# Patient Record
Sex: FEMALE | Race: WHITE | Employment: OTHER | URBAN - METROPOLITAN AREA
[De-identification: names, ages, dates, MRNs, and addresses within clinical notes are randomized per-mention and may not be internally consistent; named-entity substitution may affect disease eponyms.]

---

## 2017-01-01 ENCOUNTER — ANESTHESIA (OUTPATIENT)
Dept: SURGERY | Age: 72
DRG: 853 | End: 2017-01-01
Payer: MEDICARE

## 2017-01-01 ENCOUNTER — HOSPITAL ENCOUNTER (OUTPATIENT)
Dept: SURGERY | Age: 72
Discharge: HOME | End: 2017-02-27

## 2017-01-01 ENCOUNTER — APPOINTMENT (OUTPATIENT)
Dept: GENERAL RADIOLOGY | Age: 72
DRG: 853 | End: 2017-01-01
Attending: EMERGENCY MEDICINE
Payer: MEDICARE

## 2017-01-01 ENCOUNTER — PATIENT OUTREACH (OUTPATIENT)
Dept: CASE MANAGEMENT | Age: 72
End: 2017-01-01

## 2017-01-01 ENCOUNTER — APPOINTMENT (OUTPATIENT)
Dept: GENERAL RADIOLOGY | Age: 72
DRG: 853 | End: 2017-01-01
Attending: ANESTHESIOLOGY
Payer: MEDICARE

## 2017-01-01 ENCOUNTER — HOSPITAL ENCOUNTER (INPATIENT)
Age: 72
LOS: 2 days | DRG: 853 | End: 2017-03-01
Attending: ORTHOPAEDIC SURGERY | Admitting: INTERNAL MEDICINE
Payer: MEDICARE

## 2017-01-01 ENCOUNTER — HOSPITAL ENCOUNTER (EMERGENCY)
Age: 72
Discharge: HOME | DRG: 853 | End: 2017-02-25
Attending: EMERGENCY MEDICINE
Payer: MEDICARE

## 2017-01-01 ENCOUNTER — HOSPITAL ENCOUNTER (OUTPATIENT)
Dept: LAB | Age: 72
Discharge: HOME | End: 2017-02-22
Payer: MEDICARE

## 2017-01-01 ENCOUNTER — HOSPITAL ENCOUNTER (INPATIENT)
Age: 72
LOS: 1 days | DRG: 853 | End: 2017-03-02
Attending: EMERGENCY MEDICINE | Admitting: INTERNAL MEDICINE
Payer: MEDICARE

## 2017-01-01 ENCOUNTER — ANESTHESIA EVENT (OUTPATIENT)
Dept: SURGERY | Age: 72
DRG: 853 | End: 2017-01-01
Payer: MEDICARE

## 2017-01-01 ENCOUNTER — APPOINTMENT (OUTPATIENT)
Dept: GENERAL RADIOLOGY | Age: 72
DRG: 853 | End: 2017-01-01
Attending: INTERNAL MEDICINE
Payer: MEDICARE

## 2017-01-01 ENCOUNTER — SURGERY (OUTPATIENT)
Age: 72
End: 2017-01-01

## 2017-01-01 VITALS
WEIGHT: 255.07 LBS | BODY MASS INDEX: 40.99 KG/M2 | HEART RATE: 92 BPM | HEIGHT: 66 IN | OXYGEN SATURATION: 95 % | TEMPERATURE: 97 F | DIASTOLIC BLOOD PRESSURE: 48 MMHG | RESPIRATION RATE: 26 BRPM | SYSTOLIC BLOOD PRESSURE: 108 MMHG

## 2017-01-01 VITALS
DIASTOLIC BLOOD PRESSURE: 52 MMHG | TEMPERATURE: 98.3 F | WEIGHT: 232 LBS | BODY MASS INDEX: 37.28 KG/M2 | SYSTOLIC BLOOD PRESSURE: 105 MMHG | HEART RATE: 100 BPM | OXYGEN SATURATION: 91 % | RESPIRATION RATE: 16 BRPM | HEIGHT: 66 IN

## 2017-01-01 VITALS — OXYGEN SATURATION: 49 % | SYSTOLIC BLOOD PRESSURE: 71 MMHG | TEMPERATURE: 103 F | DIASTOLIC BLOOD PRESSURE: 39 MMHG

## 2017-01-01 DIAGNOSIS — R65.21 SEPTIC SHOCK (HCC): ICD-10-CM

## 2017-01-01 DIAGNOSIS — A41.9 SEPTIC SHOCK (HCC): ICD-10-CM

## 2017-01-01 DIAGNOSIS — I46.9 CARDIAC ARREST (HCC): Primary | ICD-10-CM

## 2017-01-01 DIAGNOSIS — M25.561 PAIN AND SWELLING OF RIGHT KNEE: Primary | ICD-10-CM

## 2017-01-01 DIAGNOSIS — I46.9 CARDIOPULMONARY ARREST WITH SUCCESSFUL RESUSCITATION (HCC): ICD-10-CM

## 2017-01-01 DIAGNOSIS — N17.9 ACUTE RENAL FAILURE, UNSPECIFIED ACUTE RENAL FAILURE TYPE (HCC): ICD-10-CM

## 2017-01-01 DIAGNOSIS — M25.461 PAIN AND SWELLING OF RIGHT KNEE: Primary | ICD-10-CM

## 2017-01-01 DIAGNOSIS — Z99.2 ESRD (END STAGE RENAL DISEASE) ON DIALYSIS (HCC): ICD-10-CM

## 2017-01-01 DIAGNOSIS — N18.6 ESRD (END STAGE RENAL DISEASE) ON DIALYSIS (HCC): ICD-10-CM

## 2017-01-01 DIAGNOSIS — Z99.2 TYPE 2 DIABETES MELLITUS WITH CHRONIC KIDNEY DISEASE ON CHRONIC DIALYSIS, WITH LONG-TERM CURRENT USE OF INSULIN (HCC): ICD-10-CM

## 2017-01-01 DIAGNOSIS — N18.6 TYPE 2 DIABETES MELLITUS WITH CHRONIC KIDNEY DISEASE ON CHRONIC DIALYSIS, WITH LONG-TERM CURRENT USE OF INSULIN (HCC): ICD-10-CM

## 2017-01-01 DIAGNOSIS — Z66 DNR (DO NOT RESUSCITATE): ICD-10-CM

## 2017-01-01 DIAGNOSIS — E11.22 TYPE 2 DIABETES MELLITUS WITH CHRONIC KIDNEY DISEASE ON CHRONIC DIALYSIS, WITH LONG-TERM CURRENT USE OF INSULIN (HCC): ICD-10-CM

## 2017-01-01 DIAGNOSIS — Z79.4 TYPE 2 DIABETES MELLITUS WITH CHRONIC KIDNEY DISEASE ON CHRONIC DIALYSIS, WITH LONG-TERM CURRENT USE OF INSULIN (HCC): ICD-10-CM

## 2017-01-01 DIAGNOSIS — J96.01 ACUTE RESPIRATORY FAILURE WITH HYPOXIA (HCC): ICD-10-CM

## 2017-01-01 DIAGNOSIS — E87.20 LACTIC ACIDOSIS: ICD-10-CM

## 2017-01-01 DIAGNOSIS — N18.6 END STAGE RENAL DISEASE (HCC): ICD-10-CM

## 2017-01-01 DIAGNOSIS — G93.1 ANOXIC BRAIN INJURY (HCC): ICD-10-CM

## 2017-01-01 LAB
ALBUMIN SERPL BCP-MCNC: 2 G/DL (ref 3.2–4.6)
ALBUMIN SERPL BCP-MCNC: 2.1 G/DL (ref 3.2–4.6)
ALBUMIN SERPL BCP-MCNC: 2.7 G/DL (ref 3.2–4.6)
ALBUMIN/GLOB SERPL: 0.5 {RATIO} (ref 1.2–3.5)
ALBUMIN/GLOB SERPL: 0.5 {RATIO} (ref 1.2–3.5)
ALBUMIN/GLOB SERPL: 0.6 {RATIO} (ref 1.2–3.5)
ALP SERPL-CCNC: 112 U/L (ref 50–136)
ALP SERPL-CCNC: 132 U/L (ref 50–136)
ALP SERPL-CCNC: 134 U/L (ref 50–136)
ALT SERPL-CCNC: 101 U/L (ref 12–65)
ALT SERPL-CCNC: 146 U/L (ref 12–65)
ALT SERPL-CCNC: 251 U/L (ref 12–65)
ANION GAP BLD CALC-SCNC: 11 MMOL/L (ref 7–16)
ANION GAP BLD CALC-SCNC: 15 MMOL/L (ref 7–16)
ANION GAP BLD CALC-SCNC: 16 MMOL/L (ref 7–16)
ANION GAP BLD CALC-SCNC: 17 MMOL/L (ref 7–16)
ANION GAP BLD CALC-SCNC: 23 MMOL/L (ref 7–16)
ANION GAP BLD CALC-SCNC: 26 MMOL/L (ref 7–16)
APPEARANCE FLD: NORMAL
APTT PPP: 43.2 SEC (ref 25.3–32.9)
ARTERIAL PATENCY WRIST A: ABNORMAL
ARTERIAL PATENCY WRIST A: POSITIVE
AST SERPL W P-5'-P-CCNC: 23 U/L (ref 15–37)
AST SERPL W P-5'-P-CCNC: 258 U/L (ref 15–37)
AST SERPL W P-5'-P-CCNC: 923 U/L (ref 15–37)
ATRIAL RATE: 117 BPM
ATRIAL RATE: 232 BPM
ATRIAL RATE: 84 BPM
BACTERIA SPEC CULT: ABNORMAL
BACTERIA SPEC CULT: ABNORMAL
BACTERIA SPEC CULT: NORMAL
BASE DEFICIT BLDA-SCNC: 8 MMOL/L (ref 0–2)
BASE DEFICIT BLDA-SCNC: 8.9 MMOL/L (ref 0–2)
BASE DEFICIT BLDA-SCNC: 9.7 MMOL/L (ref 0–2)
BASE EXCESS BLDA CALC-SCNC: 0.1 MMOL/L (ref 0–3)
BASE EXCESS BLDA CALC-SCNC: 0.6 MMOL/L (ref 0–3)
BASOPHILS # BLD AUTO: 0 K/UL (ref 0–0.2)
BASOPHILS # BLD: 0 % (ref 0–2)
BDY SITE: ABNORMAL
BILIRUB SERPL-MCNC: 0.5 MG/DL (ref 0.2–1.1)
BILIRUB SERPL-MCNC: 0.9 MG/DL (ref 0.2–1.1)
BILIRUB SERPL-MCNC: 1 MG/DL (ref 0.2–1.1)
BUN SERPL-MCNC: 27 MG/DL (ref 8–23)
BUN SERPL-MCNC: 45 MG/DL (ref 8–23)
BUN SERPL-MCNC: 47 MG/DL (ref 8–23)
BUN SERPL-MCNC: 52 MG/DL (ref 8–23)
BUN SERPL-MCNC: 56 MG/DL (ref 8–23)
BUN SERPL-MCNC: 67 MG/DL (ref 8–23)
CALCIUM SERPL-MCNC: 10.4 MG/DL (ref 8.3–10.4)
CALCIUM SERPL-MCNC: 8.7 MG/DL (ref 8.3–10.4)
CALCIUM SERPL-MCNC: 8.8 MG/DL (ref 8.3–10.4)
CALCIUM SERPL-MCNC: 8.9 MG/DL (ref 8.3–10.4)
CALCIUM SERPL-MCNC: 9.3 MG/DL (ref 8.3–10.4)
CALCIUM SERPL-MCNC: 9.4 MG/DL (ref 8.3–10.4)
CALCULATED P AXIS, ECG09: 63 DEGREES
CALCULATED P AXIS, ECG09: 67 DEGREES
CALCULATED P AXIS, ECG09: NORMAL DEGREES
CALCULATED R AXIS, ECG10: -116 DEGREES
CALCULATED R AXIS, ECG10: -138 DEGREES
CALCULATED R AXIS, ECG10: 56 DEGREES
CALCULATED T AXIS, ECG11: -161 DEGREES
CALCULATED T AXIS, ECG11: -62 DEGREES
CALCULATED T AXIS, ECG11: 85 DEGREES
CHLORIDE SERPL-SCNC: 100 MMOL/L (ref 98–107)
CHLORIDE SERPL-SCNC: 94 MMOL/L (ref 98–107)
CHLORIDE SERPL-SCNC: 95 MMOL/L (ref 98–107)
CHLORIDE SERPL-SCNC: 95 MMOL/L (ref 98–107)
CHLORIDE SERPL-SCNC: 99 MMOL/L (ref 98–107)
CHLORIDE SERPL-SCNC: 99 MMOL/L (ref 98–107)
CO2 SERPL-SCNC: 18 MMOL/L (ref 21–32)
CO2 SERPL-SCNC: 19 MMOL/L (ref 21–32)
CO2 SERPL-SCNC: 23 MMOL/L (ref 21–32)
CO2 SERPL-SCNC: 25 MMOL/L (ref 21–32)
CO2 SERPL-SCNC: 27 MMOL/L (ref 21–32)
CO2 SERPL-SCNC: 29 MMOL/L (ref 21–32)
COHGB MFR BLD: 0 % (ref 0.5–1.5)
COHGB MFR BLD: 0.3 % (ref 0.5–1.5)
COHGB MFR BLD: 0.9 % (ref 0.5–1.5)
COLOR FLD: NORMAL
CREAT SERPL-MCNC: 5.08 MG/DL (ref 0.6–1)
CREAT SERPL-MCNC: 7.35 MG/DL (ref 0.6–1)
CREAT SERPL-MCNC: 7.74 MG/DL (ref 0.6–1)
CREAT SERPL-MCNC: 8.67 MG/DL (ref 0.6–1)
CREAT SERPL-MCNC: 8.95 MG/DL (ref 0.6–1)
CREAT SERPL-MCNC: 9.25 MG/DL (ref 0.6–1)
CRP SERPL HS-MCNC: >190 MG/L
CRP SERPL-MCNC: 19 MG/DL (ref 0–0.9)
DIAGNOSIS, 93000: NORMAL
DIASTOLIC BP, ECG02: NORMAL MMHG
DIFFERENTIAL METHOD BLD: ABNORMAL
DO-HGB BLD-MCNC: 1 % (ref 0–5)
DO-HGB BLD-MCNC: 1 % (ref 0–5)
DO-HGB BLD-MCNC: 2 % (ref 0–5)
EOSINOPHIL # BLD: 0 K/UL (ref 0–0.8)
EOSINOPHIL # BLD: 0.1 K/UL (ref 0–0.8)
EOSINOPHIL # BLD: 0.1 K/UL (ref 0–0.8)
EOSINOPHIL NFR BLD: 0 % (ref 0.5–7.8)
EOSINOPHIL NFR BLD: 1 % (ref 0.5–7.8)
EOSINOPHIL NFR BLD: 1 % (ref 0.5–7.8)
ERYTHROCYTE [DISTWIDTH] IN BLOOD BY AUTOMATED COUNT: 14.2 % (ref 11.9–14.6)
ERYTHROCYTE [DISTWIDTH] IN BLOOD BY AUTOMATED COUNT: 14.3 % (ref 11.9–14.6)
ERYTHROCYTE [DISTWIDTH] IN BLOOD BY AUTOMATED COUNT: 14.5 % (ref 11.9–14.6)
ERYTHROCYTE [DISTWIDTH] IN BLOOD BY AUTOMATED COUNT: 14.6 % (ref 11.9–14.6)
ERYTHROCYTE [DISTWIDTH] IN BLOOD BY AUTOMATED COUNT: 14.9 % (ref 11.9–14.6)
ERYTHROCYTE [SEDIMENTATION RATE] IN BLOOD: 105 MM/HR (ref 0–30)
ERYTHROCYTE [SEDIMENTATION RATE] IN BLOOD: 109 MM/HR (ref 0–30)
FIO2 ON VENT: 100 %
FIO2 ON VENT: 100 %
FIO2 ON VENT: 40 %
FIO2 ON VENT: 50 %
GAS FLOW.O2 O2 DELIVERY SYS: 4 L/MIN
GLOBULIN SER CALC-MCNC: 4.2 G/DL (ref 2.3–3.5)
GLOBULIN SER CALC-MCNC: 4.3 G/DL (ref 2.3–3.5)
GLOBULIN SER CALC-MCNC: 4.4 G/DL (ref 2.3–3.5)
GLUCOSE BLD STRIP.AUTO-MCNC: 121 MG/DL (ref 65–100)
GLUCOSE BLD STRIP.AUTO-MCNC: 132 MG/DL (ref 65–100)
GLUCOSE BLD STRIP.AUTO-MCNC: 133 MG/DL (ref 65–100)
GLUCOSE BLD STRIP.AUTO-MCNC: 166 MG/DL (ref 65–100)
GLUCOSE BLD STRIP.AUTO-MCNC: 195 MG/DL (ref 65–100)
GLUCOSE BLD STRIP.AUTO-MCNC: 256 MG/DL (ref 65–100)
GLUCOSE BLD STRIP.AUTO-MCNC: 286 MG/DL (ref 65–100)
GLUCOSE SERPL-MCNC: 100 MG/DL (ref 65–100)
GLUCOSE SERPL-MCNC: 111 MG/DL (ref 65–100)
GLUCOSE SERPL-MCNC: 120 MG/DL (ref 65–100)
GLUCOSE SERPL-MCNC: 294 MG/DL (ref 65–100)
GLUCOSE SERPL-MCNC: 36 MG/DL (ref 65–100)
GLUCOSE SERPL-MCNC: 58 MG/DL (ref 65–100)
GRAM STN SPEC: ABNORMAL
GRAM STN SPEC: ABNORMAL
HCO3 BLDA-SCNC: 16 MMOL/L (ref 22–26)
HCO3 BLDA-SCNC: 16 MMOL/L (ref 22–26)
HCO3 BLDA-SCNC: 19 MMOL/L (ref 22–26)
HCO3 BLDA-SCNC: 22 MMOL/L (ref 22–26)
HCO3 BLDA-SCNC: 25 MMOL/L (ref 22–26)
HCT VFR BLD AUTO: 28.7 % (ref 35.8–46.3)
HCT VFR BLD AUTO: 29.3 % (ref 35.8–46.3)
HCT VFR BLD AUTO: 29.7 % (ref 35.8–46.3)
HCT VFR BLD AUTO: 30.1 % (ref 35.8–46.3)
HCT VFR BLD AUTO: 31.2 % (ref 35.8–46.3)
HGB BLD-MCNC: 8.9 G/DL (ref 11.7–15.4)
HGB BLD-MCNC: 9.2 G/DL (ref 11.7–15.4)
HGB BLD-MCNC: 9.2 G/DL (ref 11.7–15.4)
HGB BLD-MCNC: 9.4 G/DL (ref 11.7–15.4)
HGB BLD-MCNC: 9.5 G/DL (ref 11.7–15.4)
HGB BLD-MCNC: 9.6 G/DL (ref 11.7–15.4)
HGB BLD-MCNC: 9.6 G/DL (ref 11.7–15.4)
HGB BLDMV-MCNC: 10.5 GM/DL (ref 11.7–15)
HGB BLDMV-MCNC: 11.7 GM/DL (ref 11.7–15)
HGB BLDMV-MCNC: 7.2 GM/DL (ref 11.7–15)
IMM GRANULOCYTES # BLD: 0 K/UL (ref 0–0.5)
IMM GRANULOCYTES # BLD: 0 K/UL (ref 0–0.5)
IMM GRANULOCYTES # BLD: 0.1 K/UL (ref 0–0.5)
IMM GRANULOCYTES NFR BLD AUTO: 0.2 % (ref 0–5)
IMM GRANULOCYTES NFR BLD AUTO: 0.3 % (ref 0–5)
IMM GRANULOCYTES NFR BLD AUTO: 0.5 % (ref 0–5)
INR PPP: 1.2 (ref 0.9–1.2)
INR PPP: 1.8 (ref 0.9–1.2)
LACTATE BLD-SCNC: 1.3 MMOL/L (ref 0.5–1.9)
LACTATE SERPL-SCNC: 2.7 MMOL/L (ref 0.4–2)
LACTATE SERPL-SCNC: 3.7 MMOL/L (ref 0.4–2)
LACTATE SERPL-SCNC: 6.3 MMOL/L (ref 0.4–2)
LACTATE SERPL-SCNC: 8.7 MMOL/L (ref 0.4–2)
LYMPHOCYTES # BLD AUTO: 11 %
LYMPHOCYTES # BLD AUTO: 11 % (ref 13–44)
LYMPHOCYTES # BLD AUTO: 13 % (ref 13–44)
LYMPHOCYTES # BLD AUTO: 16 % (ref 13–44)
LYMPHOCYTES # BLD: 0.6 K/UL (ref 0.5–4.6)
LYMPHOCYTES # BLD: 1.2 K/UL (ref 0.5–4.6)
LYMPHOCYTES # BLD: 1.5 K/UL (ref 0.5–4.6)
LYMPHOCYTES # BLD: 1.5 K/UL (ref 0.5–4.6)
LYMPHOCYTES # BLD: 1.7 K/UL (ref 0.5–4.6)
LYMPHOCYTES NFR BLD MANUAL: 9 % (ref 16–44)
LYMPHOCYTES NFR FLD: 6 %
MAGNESIUM SERPL-MCNC: 1.6 MG/DL (ref 1.8–2.4)
MCH RBC QN AUTO: 32.6 PG (ref 26.1–32.9)
MCH RBC QN AUTO: 32.6 PG (ref 26.1–32.9)
MCH RBC QN AUTO: 32.7 PG (ref 26.1–32.9)
MCH RBC QN AUTO: 33.1 PG (ref 26.1–32.9)
MCH RBC QN AUTO: 33.3 PG (ref 26.1–32.9)
MCHC RBC AUTO-ENTMCNC: 30.8 G/DL (ref 31.4–35)
MCHC RBC AUTO-ENTMCNC: 31 G/DL (ref 31.4–35)
MCHC RBC AUTO-ENTMCNC: 31.9 G/DL (ref 31.4–35)
MCHC RBC AUTO-ENTMCNC: 32 G/DL (ref 31.4–35)
MCHC RBC AUTO-ENTMCNC: 32.1 G/DL (ref 31.4–35)
MCV RBC AUTO: 101.7 FL (ref 79.6–97.8)
MCV RBC AUTO: 102.1 FL (ref 79.6–97.8)
MCV RBC AUTO: 104.5 FL (ref 79.6–97.8)
MCV RBC AUTO: 105.5 FL (ref 79.6–97.8)
MCV RBC AUTO: 107.6 FL (ref 79.6–97.8)
METAMYELOCYTES NFR BLD MANUAL: 1 %
METHGB MFR BLD: 0.2 % (ref 0–1.5)
METHGB MFR BLD: 0.3 % (ref 0–1.5)
METHGB MFR BLD: 0.3 % (ref 0–1.5)
MONOCYTES # BLD: 0.1 K/UL (ref 0.1–1.3)
MONOCYTES # BLD: 0.2 K/UL (ref 0.1–1.3)
MONOCYTES # BLD: 0.6 K/UL (ref 0.1–1.3)
MONOCYTES # BLD: 0.6 K/UL (ref 0.1–1.3)
MONOCYTES # BLD: 1.1 K/UL (ref 0.1–1.3)
MONOCYTES NFR BLD AUTO: 1 %
MONOCYTES NFR BLD AUTO: 10 % (ref 4–12)
MONOCYTES NFR BLD AUTO: 8 % (ref 4–12)
MONOCYTES NFR BLD AUTO: 9 % (ref 4–12)
MONOCYTES NFR BLD MANUAL: 1 % (ref 3–9)
MONOS+MACROS NFR FLD: 1 %
NEUTS BAND NFR BLD MANUAL: 1 % (ref 0–10)
NEUTS SEG # BLD: 10.2 K/UL (ref 1.7–8.2)
NEUTS SEG # BLD: 12.2 K/UL (ref 1.7–8.2)
NEUTS SEG # BLD: 15.2 K/UL (ref 1.7–8.2)
NEUTS SEG # BLD: 4.3 K/UL (ref 1.7–8.2)
NEUTS SEG # BLD: 5.7 K/UL (ref 1.7–8.2)
NEUTS SEG NFR BLD AUTO: 75 % (ref 43–78)
NEUTS SEG NFR BLD AUTO: 78 % (ref 43–78)
NEUTS SEG NFR BLD AUTO: 78 % (ref 43–78)
NEUTS SEG NFR BLD AUTO: 86 %
NEUTS SEG NFR BLD MANUAL: 90 % (ref 47–75)
NEUTS SEG NFR FLD: 93 %
NRBC BLD-RTO: 1 PER 100 WBC
NUC CELL # FLD: NORMAL /CU MM
OXYHGB MFR BLDA: 96.6 % (ref 94–97)
OXYHGB MFR BLDA: 98.1 % (ref 94–97)
OXYHGB MFR BLDA: 98.6 % (ref 94–97)
P-R INTERVAL, ECG05: 183 MS
P-R INTERVAL, ECG05: NORMAL MS
P-R INTERVAL, ECG05: NORMAL MS
PCO2 BLDA: 24 MMHG (ref 35–45)
PCO2 BLDA: 34 MMHG (ref 35–45)
PCO2 BLDA: 37 MMHG (ref 35–45)
PCO2 BLDA: 42 MMHG (ref 35–45)
PCO2 BLDA: 45 MMHG (ref 35–45)
PEEP RESPIRATORY: 8 CM[H2O]
PH BLDA: 7.24 [PH] (ref 7.35–7.45)
PH BLDA: 7.27 [PH] (ref 7.35–7.45)
PH BLDA: 7.3 [PH] (ref 7.35–7.45)
PH BLDA: 7.39 [PH] (ref 7.35–7.45)
PH BLDA: 7.57 [PH] (ref 7.35–7.45)
PHOSPHATE SERPL-MCNC: 3.9 MG/DL (ref 2.3–3.7)
PLATELET # BLD AUTO: 128 K/UL (ref 150–450)
PLATELET # BLD AUTO: 129 K/UL (ref 150–450)
PLATELET # BLD AUTO: 131 K/UL (ref 150–450)
PLATELET # BLD AUTO: 132 K/UL (ref 150–450)
PLATELET # BLD AUTO: 146 K/UL (ref 150–450)
PLATELET COMMENTS,PCOM: ABNORMAL
PLATELET COMMENTS,PCOM: ADEQUATE
PMV BLD AUTO: 11.6 FL (ref 10.8–14.1)
PMV BLD AUTO: 12 FL (ref 10.8–14.1)
PMV BLD AUTO: 12.2 FL (ref 10.8–14.1)
PMV BLD AUTO: 12.5 FL (ref 10.8–14.1)
PMV BLD AUTO: 12.6 FL (ref 10.8–14.1)
PO2 BLDA: 139 MMHG (ref 75–100)
PO2 BLDA: 253 MMHG (ref 75–100)
PO2 BLDA: 343 MMHG (ref 75–100)
PO2 BLDA: 68 MMHG (ref 75–100)
PO2 BLDA: 76 MMHG (ref 75–100)
POTASSIUM SERPL-SCNC: 3.4 MMOL/L (ref 3.5–5.1)
POTASSIUM SERPL-SCNC: 4.1 MMOL/L (ref 3.5–5.1)
POTASSIUM SERPL-SCNC: 4.6 MMOL/L (ref 3.5–5.1)
POTASSIUM SERPL-SCNC: 5 MMOL/L (ref 3.5–5.1)
POTASSIUM SERPL-SCNC: 5.6 MMOL/L (ref 3.5–5.1)
POTASSIUM SERPL-SCNC: 5.8 MMOL/L (ref 3.5–5.1)
PROCALCITONIN SERPL-MCNC: 6.3 NG/ML
PROCALCITONIN SERPL-MCNC: 8.3 NG/ML
PROT SERPL-MCNC: 6.2 G/DL (ref 6.3–8.2)
PROT SERPL-MCNC: 6.4 G/DL (ref 6.3–8.2)
PROT SERPL-MCNC: 7.1 G/DL (ref 6.3–8.2)
PROTHROMBIN TIME: 12.5 SEC (ref 9.6–12)
PROTHROMBIN TIME: 18.5 SEC (ref 9.6–12)
Q-T INTERVAL, ECG07: 370 MS
Q-T INTERVAL, ECG07: 408 MS
Q-T INTERVAL, ECG07: 474 MS
QRS DURATION, ECG06: 112 MS
QRS DURATION, ECG06: 114 MS
QRS DURATION, ECG06: 125 MS
QTC CALCULATION (BEZET), ECG08: 482 MS
QTC CALCULATION (BEZET), ECG08: 514 MS
QTC CALCULATION (BEZET), ECG08: 678 MS
RBC # BLD AUTO: 2.72 M/UL (ref 4.05–5.25)
RBC # BLD AUTO: 2.88 M/UL (ref 4.05–5.25)
RBC # BLD AUTO: 2.88 M/UL (ref 4.05–5.25)
RBC # BLD AUTO: 2.9 M/UL (ref 4.05–5.25)
RBC # BLD AUTO: 2.91 M/UL (ref 4.05–5.25)
RBC # FLD: NORMAL /CU MM
RBC MORPH BLD: ABNORMAL
RESP RATE: 15
SAO2 % BLD: 98 % (ref 92–98.5)
SAO2 % BLD: 99 % (ref 92–98.5)
SAO2 % BLD: 99 % (ref 92–98.5)
SERVICE CMNT-IMP: ABNORMAL
SERVICE CMNT-IMP: NORMAL
SODIUM SERPL-SCNC: 135 MMOL/L (ref 136–145)
SODIUM SERPL-SCNC: 135 MMOL/L (ref 136–145)
SODIUM SERPL-SCNC: 138 MMOL/L (ref 136–145)
SODIUM SERPL-SCNC: 138 MMOL/L (ref 136–145)
SODIUM SERPL-SCNC: 140 MMOL/L (ref 136–145)
SODIUM SERPL-SCNC: 145 MMOL/L (ref 136–145)
SPECIMEN SOURCE FLD: NORMAL
SYSTOLIC BP, ECG01: NORMAL MMHG
TROPONIN I SERPL-MCNC: 0.06 NG/ML (ref 0.02–0.05)
TROPONIN I SERPL-MCNC: 0.09 NG/ML (ref 0.02–0.05)
TROPONIN I SERPL-MCNC: 0.1 NG/ML (ref 0.02–0.05)
TROPONIN I SERPL-MCNC: 0.62 NG/ML (ref 0.02–0.05)
VANCOMYCIN SERPL-MCNC: 25.1 UG/ML
VENTILATION MODE VENT: ABNORMAL
VENTRICULAR RATE, ECG03: 116 BPM
VENTRICULAR RATE, ECG03: 123 BPM
VENTRICULAR RATE, ECG03: 84 BPM
VT SETTING VENT: 600 ML
VT SETTING VENT: 600 ML
WBC # BLD AUTO: 13.1 K/UL (ref 4.3–11.1)
WBC # BLD AUTO: 13.8 K/UL (ref 4.3–11.1)
WBC # BLD AUTO: 16.9 K/UL (ref 4.3–11.1)
WBC # BLD AUTO: 5.5 K/UL (ref 4.3–11.1)
WBC # BLD AUTO: 7.6 K/UL (ref 4.3–11.1)
WBC MORPH BLD: ABNORMAL

## 2017-01-01 PROCEDURE — 85025 COMPLETE CBC W/AUTO DIFF WBC: CPT | Performed by: INTERNAL MEDICINE

## 2017-01-01 PROCEDURE — 74011636637 HC RX REV CODE- 636/637: Performed by: INTERNAL MEDICINE

## 2017-01-01 PROCEDURE — 87077 CULTURE AEROBIC IDENTIFY: CPT | Performed by: ORTHOPAEDIC SURGERY

## 2017-01-01 PROCEDURE — 5A12012 PERFORMANCE OF CARDIAC OUTPUT, SINGLE, MANUAL: ICD-10-PCS | Performed by: ANESTHESIOLOGY

## 2017-01-01 PROCEDURE — 0SRC0J9 REPLACEMENT OF RIGHT KNEE JOINT WITH SYNTHETIC SUBSTITUTE, CEMENTED, OPEN APPROACH: ICD-10-PCS | Performed by: ORTHOPAEDIC SURGERY

## 2017-01-01 PROCEDURE — 80053 COMPREHEN METABOLIC PANEL: CPT | Performed by: INTERNAL MEDICINE

## 2017-01-01 PROCEDURE — 89050 BODY FLUID CELL COUNT: CPT | Performed by: ORTHOPAEDIC SURGERY

## 2017-01-01 PROCEDURE — 83735 ASSAY OF MAGNESIUM: CPT | Performed by: INTERNAL MEDICINE

## 2017-01-01 PROCEDURE — 5A1935Z RESPIRATORY VENTILATION, LESS THAN 24 CONSECUTIVE HOURS: ICD-10-PCS | Performed by: INTERNAL MEDICINE

## 2017-01-01 PROCEDURE — 96375 TX/PRO/DX INJ NEW DRUG ADDON: CPT | Performed by: EMERGENCY MEDICINE

## 2017-01-01 PROCEDURE — 87075 CULTR BACTERIA EXCEPT BLOOD: CPT | Performed by: ORTHOPAEDIC SURGERY

## 2017-01-01 PROCEDURE — 99223 1ST HOSP IP/OBS HIGH 75: CPT | Performed by: INTERNAL MEDICINE

## 2017-01-01 PROCEDURE — 74011250636 HC RX REV CODE- 250/636: Performed by: INTERNAL MEDICINE

## 2017-01-01 PROCEDURE — 74011250637 HC RX REV CODE- 250/637: Performed by: INTERNAL MEDICINE

## 2017-01-01 PROCEDURE — 82962 GLUCOSE BLOOD TEST: CPT

## 2017-01-01 PROCEDURE — 99285 EMERGENCY DEPT VISIT HI MDM: CPT | Performed by: EMERGENCY MEDICINE

## 2017-01-01 PROCEDURE — 0BH17EZ INSERTION OF ENDOTRACHEAL AIRWAY INTO TRACHEA, VIA NATURAL OR ARTIFICIAL OPENING: ICD-10-PCS | Performed by: ANESTHESIOLOGY

## 2017-01-01 PROCEDURE — 74011000250 HC RX REV CODE- 250: Performed by: EMERGENCY MEDICINE

## 2017-01-01 PROCEDURE — 0SPC0JZ REMOVAL OF SYNTHETIC SUBSTITUTE FROM RIGHT KNEE JOINT, OPEN APPROACH: ICD-10-PCS | Performed by: ORTHOPAEDIC SURGERY

## 2017-01-01 PROCEDURE — 96374 THER/PROPH/DIAG INJ IV PUSH: CPT | Performed by: EMERGENCY MEDICINE

## 2017-01-01 PROCEDURE — 36600 WITHDRAWAL OF ARTERIAL BLOOD: CPT

## 2017-01-01 PROCEDURE — 74011000250 HC RX REV CODE- 250: Performed by: INTERNAL MEDICINE

## 2017-01-01 PROCEDURE — 5A12012 PERFORMANCE OF CARDIAC OUTPUT, SINGLE, MANUAL: ICD-10-PCS | Performed by: EMERGENCY MEDICINE

## 2017-01-01 PROCEDURE — 74011250636 HC RX REV CODE- 250/636: Performed by: EMERGENCY MEDICINE

## 2017-01-01 PROCEDURE — 83605 ASSAY OF LACTIC ACID: CPT | Performed by: INTERNAL MEDICINE

## 2017-01-01 PROCEDURE — 92950 HEART/LUNG RESUSCITATION CPR: CPT | Performed by: EMERGENCY MEDICINE

## 2017-01-01 PROCEDURE — 76450000000

## 2017-01-01 PROCEDURE — 82803 BLOOD GASES ANY COMBINATION: CPT

## 2017-01-01 PROCEDURE — 96376 TX/PRO/DX INJ SAME DRUG ADON: CPT | Performed by: EMERGENCY MEDICINE

## 2017-01-01 PROCEDURE — 87205 SMEAR GRAM STAIN: CPT | Performed by: ORTHOPAEDIC SURGERY

## 2017-01-01 PROCEDURE — 84100 ASSAY OF PHOSPHORUS: CPT | Performed by: INTERNAL MEDICINE

## 2017-01-01 PROCEDURE — 80048 BASIC METABOLIC PNL TOTAL CA: CPT | Performed by: EMERGENCY MEDICINE

## 2017-01-01 PROCEDURE — 74011000258 HC RX REV CODE- 258: Performed by: INTERNAL MEDICINE

## 2017-01-01 PROCEDURE — C9113 INJ PANTOPRAZOLE SODIUM, VIA: HCPCS | Performed by: INTERNAL MEDICINE

## 2017-01-01 PROCEDURE — 93005 ELECTROCARDIOGRAM TRACING: CPT | Performed by: EMERGENCY MEDICINE

## 2017-01-01 PROCEDURE — 65610000006 HC RM INTENSIVE CARE

## 2017-01-01 PROCEDURE — 87186 SC STD MICRODIL/AGAR DIL: CPT | Performed by: ORTHOPAEDIC SURGERY

## 2017-01-01 DEVICE — CEMENT BNE GENTAMC HV R+G 40GM -- PALACOS R+G 5036964: Type: IMPLANTABLE DEVICE | Site: KNEE | Status: FUNCTIONAL

## 2017-01-01 DEVICE — STIMULAN® RAPID CURE PROVIDED STERILE FOR SINGLE PATIENT USE. STIMULAN® RAPID CURE CONTAINS CALCIUM SULFATE POWDER AND MIXING SOLUTION IN PRE-MEASURED QUANTITIES SO THAT WHEN MIXED TOGETHER IN A STERILE MIXING BOWL, THE RESULTANT PASTE IS TO BE DIGITALLY PACKED INTO OPEN BONE VOID/GAP TO SET INSITU OR PLACED INTO THE MOULD PROVIDED, THE MIXTURE SETS TO FORM BEADS. THE BIODEGRADABLE, RADIOPAQUE BEADS ARE RESORBED IN APPROXIMATELY 30 – 60 DAYS WHEN USED IN ACCORDANCE WITH THE DEVICE LABELLING. STIMULAN® RAPID CURE IS MANUFACTURED FROM SYNTHETIC IMPLANT GRADE CALCIUM SULFATE DIHYDRATE(CASO4.2H2O) THAT RESORBS AND IS REPLACED WITH BONE DURING THE HEALING PROCESS. ALSO, AS THE BONE VOID FILLER BEADS ARE BIODEGRADABLE AND BIOCOMPATIBLE, THEY MAY BE USED AT AN INFECTED SITE.
Type: IMPLANTABLE DEVICE | Site: KNEE | Status: FUNCTIONAL
Brand: STIMULAN® RAPID CURE

## 2017-01-01 DEVICE — COMPNT FEM POST STBL 6 R --: Type: IMPLANTABLE DEVICE | Site: KNEE | Status: FUNCTIONAL

## 2017-01-01 RX ORDER — DIPHENHYDRAMINE HCL 25 MG
25 CAPSULE ORAL
Status: DISCONTINUED | OUTPATIENT
Start: 2017-01-01 | End: 2017-01-01 | Stop reason: HOSPADM

## 2017-01-01 RX ORDER — INSULIN GLARGINE 100 [IU]/ML
70 INJECTION, SOLUTION SUBCUTANEOUS
Status: DISCONTINUED | OUTPATIENT
Start: 2017-01-01 | End: 2017-01-01

## 2017-01-01 RX ORDER — INSULIN LISPRO 100 [IU]/ML
INJECTION, SOLUTION INTRAVENOUS; SUBCUTANEOUS
Status: CANCELLED | OUTPATIENT
Start: 2017-01-01

## 2017-01-01 RX ORDER — NEOMYCIN AND POLYMYXIN B SULFATES 40; 200000 MG/ML; [USP'U]/ML
SOLUTION IRRIGATION AS NEEDED
Status: DISCONTINUED | OUTPATIENT
Start: 2017-01-01 | End: 2017-01-01 | Stop reason: HOSPADM

## 2017-01-01 RX ORDER — PANTOPRAZOLE SODIUM 40 MG/1
40 TABLET, DELAYED RELEASE ORAL
Status: CANCELLED | OUTPATIENT
Start: 2017-01-01

## 2017-01-01 RX ORDER — SODIUM CHLORIDE 0.9 % (FLUSH) 0.9 %
5-10 SYRINGE (ML) INJECTION AS NEEDED
Status: DISCONTINUED | OUTPATIENT
Start: 2017-01-01 | End: 2017-01-01 | Stop reason: HOSPADM

## 2017-01-01 RX ORDER — NALOXONE HYDROCHLORIDE 0.4 MG/ML
.2-.4 INJECTION, SOLUTION INTRAMUSCULAR; INTRAVENOUS; SUBCUTANEOUS
Status: CANCELLED | OUTPATIENT
Start: 2017-01-01

## 2017-01-01 RX ORDER — ROPIVACAINE HYDROCHLORIDE 2 MG/ML
INJECTION, SOLUTION EPIDURAL; INFILTRATION; PERINEURAL AS NEEDED
Status: DISCONTINUED | OUTPATIENT
Start: 2017-01-01 | End: 2017-01-01 | Stop reason: HOSPADM

## 2017-01-01 RX ORDER — INSULIN GLARGINE 100 [IU]/ML
35 INJECTION, SOLUTION SUBCUTANEOUS
Status: CANCELLED | OUTPATIENT
Start: 2017-01-01

## 2017-01-01 RX ORDER — ROPINIROLE 0.25 MG/1
0.5 TABLET, FILM COATED ORAL
Status: CANCELLED | OUTPATIENT
Start: 2017-01-01

## 2017-01-01 RX ORDER — LORAZEPAM 0.5 MG/1
0.5 TABLET ORAL
Status: CANCELLED | OUTPATIENT
Start: 2017-01-01

## 2017-01-01 RX ORDER — SODIUM CHLORIDE 0.9 % (FLUSH) 0.9 %
5-10 SYRINGE (ML) INJECTION EVERY 8 HOURS
Status: DISCONTINUED | OUTPATIENT
Start: 2017-01-01 | End: 2017-01-01 | Stop reason: HOSPADM

## 2017-01-01 RX ORDER — ROPINIROLE 1 MG/1
0.5 TABLET, FILM COATED ORAL
Status: DISCONTINUED | OUTPATIENT
Start: 2017-01-01 | End: 2017-01-01 | Stop reason: HOSPADM

## 2017-01-01 RX ORDER — SODIUM CHLORIDE, SODIUM LACTATE, POTASSIUM CHLORIDE, CALCIUM CHLORIDE 600; 310; 30; 20 MG/100ML; MG/100ML; MG/100ML; MG/100ML
75 INJECTION, SOLUTION INTRAVENOUS CONTINUOUS
Status: DISCONTINUED | OUTPATIENT
Start: 2017-01-01 | End: 2017-01-01 | Stop reason: HOSPADM

## 2017-01-01 RX ORDER — GLYCOPYRROLATE 0.2 MG/ML
INJECTION INTRAMUSCULAR; INTRAVENOUS AS NEEDED
Status: DISCONTINUED | OUTPATIENT
Start: 2017-01-01 | End: 2017-01-01 | Stop reason: HOSPADM

## 2017-01-01 RX ORDER — SODIUM CHLORIDE 0.9 % (FLUSH) 0.9 %
5-10 SYRINGE (ML) INJECTION EVERY 8 HOURS
Status: DISCONTINUED | OUTPATIENT
Start: 2017-01-01 | End: 2017-01-01

## 2017-01-01 RX ORDER — DILTIAZEM HYDROCHLORIDE 180 MG/1
180 CAPSULE, COATED, EXTENDED RELEASE ORAL DAILY
Status: DISCONTINUED | OUTPATIENT
Start: 2017-01-01 | End: 2017-01-01 | Stop reason: HOSPADM

## 2017-01-01 RX ORDER — HEPARIN SODIUM 5000 [USP'U]/ML
5000 INJECTION, SOLUTION INTRAVENOUS; SUBCUTANEOUS EVERY 12 HOURS
Status: DISCONTINUED | OUTPATIENT
Start: 2017-01-01 | End: 2017-01-01

## 2017-01-01 RX ORDER — SODIUM CHLORIDE 9 MG/ML
100 INJECTION, SOLUTION INTRAVENOUS CONTINUOUS
Status: DISCONTINUED | OUTPATIENT
Start: 2017-01-01 | End: 2017-01-01

## 2017-01-01 RX ORDER — VANCOMYCIN 1.75 GRAM/500 ML IN 0.9 % SODIUM CHLORIDE INTRAVENOUS
1750 ONCE
Status: COMPLETED | OUTPATIENT
Start: 2017-01-01 | End: 2017-01-01

## 2017-01-01 RX ORDER — INSULIN GLARGINE 100 [IU]/ML
35 INJECTION, SOLUTION SUBCUTANEOUS
Status: DISCONTINUED | OUTPATIENT
Start: 2017-01-01 | End: 2017-01-01 | Stop reason: HOSPADM

## 2017-01-01 RX ORDER — CARVEDILOL 6.25 MG/1
6.25 TABLET ORAL 2 TIMES DAILY WITH MEALS
Status: CANCELLED | OUTPATIENT
Start: 2017-01-01

## 2017-01-01 RX ORDER — MORPHINE SULFATE 10 MG/ML
INJECTION, SOLUTION INTRAMUSCULAR; INTRAVENOUS AS NEEDED
Status: DISCONTINUED | OUTPATIENT
Start: 2017-01-01 | End: 2017-01-01 | Stop reason: HOSPADM

## 2017-01-01 RX ORDER — METOPROLOL TARTRATE 5 MG/5ML
5 INJECTION INTRAVENOUS
Status: CANCELLED | OUTPATIENT
Start: 2017-01-01

## 2017-01-01 RX ORDER — ASPIRIN 325 MG
325 TABLET, DELAYED RELEASE (ENTERIC COATED) ORAL EVERY 12 HOURS
Qty: 120 TAB | Refills: 0 | Status: SHIPPED
Start: 2017-01-01 | End: 2017-01-01 | Stop reason: ALTCHOICE

## 2017-01-01 RX ORDER — PREGABALIN 50 MG/1
100 CAPSULE ORAL 3 TIMES DAILY
Status: CANCELLED | OUTPATIENT
Start: 2017-01-01

## 2017-01-01 RX ORDER — FUROSEMIDE 40 MG/1
80 TABLET ORAL 2 TIMES DAILY
Status: DISCONTINUED | OUTPATIENT
Start: 2017-01-01 | End: 2017-01-01 | Stop reason: HOSPADM

## 2017-01-01 RX ORDER — METOPROLOL TARTRATE 5 MG/5ML
5 INJECTION INTRAVENOUS
Status: DISCONTINUED | OUTPATIENT
Start: 2017-01-01 | End: 2017-01-01 | Stop reason: HOSPADM

## 2017-01-01 RX ORDER — OXYCODONE HYDROCHLORIDE 5 MG/1
5 TABLET ORAL
Status: DISCONTINUED | OUTPATIENT
Start: 2017-01-01 | End: 2017-01-01 | Stop reason: HOSPADM

## 2017-01-01 RX ORDER — ACETAMINOPHEN 500 MG
1000 TABLET ORAL EVERY 6 HOURS
Status: CANCELLED | OUTPATIENT
Start: 2017-01-01

## 2017-01-01 RX ORDER — SEVELAMER CARBONATE 800 MG/1
800 TABLET, FILM COATED ORAL
Status: DISCONTINUED | OUTPATIENT
Start: 2017-01-01 | End: 2017-01-01 | Stop reason: HOSPADM

## 2017-01-01 RX ORDER — AMOXICILLIN 250 MG
2 CAPSULE ORAL DAILY
Status: CANCELLED | OUTPATIENT
Start: 2017-01-01

## 2017-01-01 RX ORDER — DEXAMETHASONE SODIUM PHOSPHATE 100 MG/10ML
10 INJECTION INTRAMUSCULAR; INTRAVENOUS ONCE
Status: COMPLETED | OUTPATIENT
Start: 2017-01-01 | End: 2017-01-01

## 2017-01-01 RX ORDER — ACETAMINOPHEN 10 MG/ML
1000 INJECTION, SOLUTION INTRAVENOUS ONCE
Status: DISCONTINUED | OUTPATIENT
Start: 2017-01-01 | End: 2017-01-01

## 2017-01-01 RX ORDER — ACETAMINOPHEN 325 MG/1
650 TABLET ORAL
Status: CANCELLED | OUTPATIENT
Start: 2017-01-01

## 2017-01-01 RX ORDER — ASPIRIN 325 MG
325 TABLET, DELAYED RELEASE (ENTERIC COATED) ORAL EVERY 12 HOURS
Status: DISCONTINUED | OUTPATIENT
Start: 2017-01-01 | End: 2017-01-01

## 2017-01-01 RX ORDER — DILTIAZEM HYDROCHLORIDE 180 MG/1
180 CAPSULE, COATED, EXTENDED RELEASE ORAL DAILY
Status: CANCELLED | OUTPATIENT
Start: 2017-01-01

## 2017-01-01 RX ORDER — SODIUM BICARBONATE 1 MEQ/ML
50 SYRINGE (ML) INTRAVENOUS
Status: COMPLETED | OUTPATIENT
Start: 2017-01-01 | End: 2017-01-01

## 2017-01-01 RX ORDER — SODIUM CHLORIDE 9 MG/ML
250 INJECTION, SOLUTION INTRAVENOUS AS NEEDED
Status: DISCONTINUED | OUTPATIENT
Start: 2017-01-01 | End: 2017-01-01

## 2017-01-01 RX ORDER — SODIUM BICARBONATE 42 MG/ML
100 INJECTION, SOLUTION INTRAVENOUS ONCE
Status: DISCONTINUED | OUTPATIENT
Start: 2017-01-01 | End: 2017-01-01 | Stop reason: HOSPADM

## 2017-01-01 RX ORDER — PANTOPRAZOLE SODIUM 40 MG/1
40 TABLET, DELAYED RELEASE ORAL
Status: DISCONTINUED | OUTPATIENT
Start: 2017-01-01 | End: 2017-01-01 | Stop reason: HOSPADM

## 2017-01-01 RX ORDER — LEVOTHYROXINE SODIUM 50 UG/1
50 TABLET ORAL
Status: CANCELLED | OUTPATIENT
Start: 2017-01-01

## 2017-01-01 RX ORDER — VANCOMYCIN 1.75 GRAM/500 ML IN 0.9 % SODIUM CHLORIDE INTRAVENOUS
1750 SEE ADMIN INSTRUCTIONS
Status: CANCELLED | OUTPATIENT
Start: 2017-01-01

## 2017-01-01 RX ORDER — MIDAZOLAM HYDROCHLORIDE 1 MG/ML
5 INJECTION, SOLUTION INTRAMUSCULAR; INTRAVENOUS ONCE
Status: COMPLETED | OUTPATIENT
Start: 2017-01-01 | End: 2017-01-01

## 2017-01-01 RX ORDER — CARVEDILOL 25 MG/1
25 TABLET ORAL 2 TIMES DAILY WITH MEALS
Status: CANCELLED | OUTPATIENT
Start: 2017-01-01

## 2017-01-01 RX ORDER — MORPHINE SULFATE 10 MG/ML
10 INJECTION, SOLUTION INTRAMUSCULAR; INTRAVENOUS
Status: DISCONTINUED | OUTPATIENT
Start: 2017-01-01 | End: 2017-01-01 | Stop reason: HOSPADM

## 2017-01-01 RX ORDER — HYDROMORPHONE HYDROCHLORIDE 2 MG/ML
0.5 INJECTION, SOLUTION INTRAMUSCULAR; INTRAVENOUS; SUBCUTANEOUS
Status: DISCONTINUED | OUTPATIENT
Start: 2017-01-01 | End: 2017-01-01 | Stop reason: HOSPADM

## 2017-01-01 RX ORDER — MIDAZOLAM HYDROCHLORIDE 1 MG/ML
1-2 INJECTION, SOLUTION INTRAMUSCULAR; INTRAVENOUS
Status: DISCONTINUED | OUTPATIENT
Start: 2017-01-01 | End: 2017-01-01

## 2017-01-01 RX ORDER — ZOLPIDEM TARTRATE 5 MG/1
5 TABLET ORAL
Status: DISCONTINUED | OUTPATIENT
Start: 2017-01-01 | End: 2017-01-01 | Stop reason: HOSPADM

## 2017-01-01 RX ORDER — SEVELAMER HYDROCHLORIDE 800 MG/1
800 TABLET, FILM COATED ORAL 2 TIMES DAILY WITH MEALS
Status: CANCELLED | OUTPATIENT
Start: 2017-01-01

## 2017-01-01 RX ORDER — OXYCODONE HYDROCHLORIDE 5 MG/1
10 TABLET ORAL
Status: DISCONTINUED | OUTPATIENT
Start: 2017-01-01 | End: 2017-01-01

## 2017-01-01 RX ORDER — HEPARIN SODIUM 5000 [USP'U]/ML
5000 INJECTION, SOLUTION INTRAVENOUS; SUBCUTANEOUS EVERY 8 HOURS
Status: DISCONTINUED | OUTPATIENT
Start: 2017-01-01 | End: 2017-01-01 | Stop reason: HOSPADM

## 2017-01-01 RX ORDER — ACETAMINOPHEN 500 MG
1000 TABLET ORAL EVERY 6 HOURS
Status: DISCONTINUED | OUTPATIENT
Start: 2017-01-01 | End: 2017-01-01 | Stop reason: HOSPADM

## 2017-01-01 RX ORDER — ONDANSETRON 2 MG/ML
4 INJECTION INTRAMUSCULAR; INTRAVENOUS
Status: CANCELLED | OUTPATIENT
Start: 2017-01-01

## 2017-01-01 RX ORDER — OXYCODONE HYDROCHLORIDE 5 MG/1
5 TABLET ORAL
Status: DISCONTINUED | OUTPATIENT
Start: 2017-01-01 | End: 2017-01-01

## 2017-01-01 RX ORDER — LEVOTHYROXINE SODIUM 50 UG/1
50 TABLET ORAL
Status: DISCONTINUED | OUTPATIENT
Start: 2017-01-01 | End: 2017-01-01 | Stop reason: HOSPADM

## 2017-01-01 RX ORDER — DEXTROSE 50 % IN WATER (D50W) INTRAVENOUS SYRINGE
50
Status: COMPLETED | OUTPATIENT
Start: 2017-01-01 | End: 2017-01-01

## 2017-01-01 RX ORDER — LORAZEPAM 0.5 MG/1
0.5 TABLET ORAL 3 TIMES DAILY
Status: DISCONTINUED | OUTPATIENT
Start: 2017-01-01 | End: 2017-01-01

## 2017-01-01 RX ORDER — DIPHENHYDRAMINE HCL 25 MG
25 CAPSULE ORAL
Status: CANCELLED | OUTPATIENT
Start: 2017-01-01

## 2017-01-01 RX ORDER — DOCUSATE SODIUM 100 MG/1
100 CAPSULE, LIQUID FILLED ORAL DAILY
Status: CANCELLED | OUTPATIENT
Start: 2017-01-01

## 2017-01-01 RX ORDER — LIDOCAINE HYDROCHLORIDE 20 MG/ML
INJECTION, SOLUTION EPIDURAL; INFILTRATION; INTRACAUDAL; PERINEURAL AS NEEDED
Status: DISCONTINUED | OUTPATIENT
Start: 2017-01-01 | End: 2017-01-01 | Stop reason: HOSPADM

## 2017-01-01 RX ORDER — MUPIROCIN 20 MG/G
OINTMENT TOPICAL 2 TIMES DAILY
Status: DISCONTINUED | OUTPATIENT
Start: 2017-01-01 | End: 2017-01-01

## 2017-01-01 RX ORDER — CALCIUM CHLORIDE INJECTION 100 MG/ML
1 INJECTION, SOLUTION INTRAVENOUS
Status: COMPLETED | OUTPATIENT
Start: 2017-01-01 | End: 2017-01-01

## 2017-01-01 RX ORDER — IRBESARTAN 300 MG/1
300 TABLET ORAL DAILY
Status: DISCONTINUED | OUTPATIENT
Start: 2017-01-01 | End: 2017-01-01 | Stop reason: HOSPADM

## 2017-01-01 RX ORDER — FLUOXETINE HYDROCHLORIDE 20 MG/1
20 CAPSULE ORAL DAILY
Status: CANCELLED | OUTPATIENT
Start: 2017-01-01

## 2017-01-01 RX ORDER — LORAZEPAM 0.5 MG/1
0.5 TABLET ORAL
Status: DISCONTINUED | OUTPATIENT
Start: 2017-01-01 | End: 2017-01-01 | Stop reason: HOSPADM

## 2017-01-01 RX ORDER — DOXAZOSIN 4 MG/1
4 TABLET ORAL
Status: DISCONTINUED | OUTPATIENT
Start: 2017-01-01 | End: 2017-01-01 | Stop reason: HOSPADM

## 2017-01-01 RX ORDER — CARVEDILOL 25 MG/1
25 TABLET ORAL 2 TIMES DAILY WITH MEALS
Status: DISCONTINUED | OUTPATIENT
Start: 2017-01-01 | End: 2017-01-01

## 2017-01-01 RX ORDER — VANCOMYCIN HYDROCHLORIDE 1 G/20ML
INJECTION, POWDER, LYOPHILIZED, FOR SOLUTION INTRAVENOUS AS NEEDED
Status: DISCONTINUED | OUTPATIENT
Start: 2017-01-01 | End: 2017-01-01 | Stop reason: HOSPADM

## 2017-01-01 RX ORDER — FOLIC ACID 1 MG/1
1 TABLET ORAL DAILY
Status: CANCELLED | OUTPATIENT
Start: 2017-01-01

## 2017-01-01 RX ORDER — CLONIDINE HYDROCHLORIDE 0.1 MG/1
0.1 TABLET ORAL 2 TIMES DAILY
Status: DISCONTINUED | OUTPATIENT
Start: 2017-01-01 | End: 2017-01-01

## 2017-01-01 RX ORDER — SODIUM CHLORIDE 9 MG/ML
INJECTION, SOLUTION INTRAVENOUS
Status: DISCONTINUED | OUTPATIENT
Start: 2017-01-01 | End: 2017-01-01 | Stop reason: HOSPADM

## 2017-01-01 RX ORDER — HYDROMORPHONE HYDROCHLORIDE 1 MG/ML
1 INJECTION, SOLUTION INTRAMUSCULAR; INTRAVENOUS; SUBCUTANEOUS
Status: DISCONTINUED | OUTPATIENT
Start: 2017-01-01 | End: 2017-01-01

## 2017-01-01 RX ORDER — GLYCOPYRROLATE 0.2 MG/ML
0.2 INJECTION INTRAMUSCULAR; INTRAVENOUS
Status: DISCONTINUED | OUTPATIENT
Start: 2017-01-01 | End: 2017-01-01

## 2017-01-01 RX ORDER — FENTANYL CITRATE 50 UG/ML
INJECTION, SOLUTION INTRAMUSCULAR; INTRAVENOUS AS NEEDED
Status: DISCONTINUED | OUTPATIENT
Start: 2017-01-01 | End: 2017-01-01 | Stop reason: HOSPADM

## 2017-01-01 RX ORDER — EPINEPHRINE 0.1 MG/ML
1 INJECTION INTRACARDIAC; INTRAVENOUS ONCE
Status: DISCONTINUED | OUTPATIENT
Start: 2017-01-01 | End: 2017-01-01 | Stop reason: HOSPADM

## 2017-01-01 RX ORDER — ACETAMINOPHEN 650 MG/1
975 SUPPOSITORY RECTAL
Status: DISCONTINUED | OUTPATIENT
Start: 2017-01-01 | End: 2017-01-01 | Stop reason: HOSPADM

## 2017-01-01 RX ORDER — INSULIN LISPRO 100 [IU]/ML
INJECTION, SOLUTION INTRAVENOUS; SUBCUTANEOUS EVERY 6 HOURS
Status: DISCONTINUED | OUTPATIENT
Start: 2017-01-01 | End: 2017-01-01

## 2017-01-01 RX ORDER — SEVELAMER CARBONATE 800 MG/1
800 TABLET, FILM COATED ORAL
Status: CANCELLED | OUTPATIENT
Start: 2017-01-01

## 2017-01-01 RX ORDER — HYDROCODONE BITARTRATE AND ACETAMINOPHEN 5; 325 MG/1; MG/1
2 TABLET ORAL AS NEEDED
Status: DISCONTINUED | OUTPATIENT
Start: 2017-01-01 | End: 2017-01-01 | Stop reason: HOSPADM

## 2017-01-01 RX ORDER — NEOSTIGMINE METHYLSULFATE 1 MG/ML
INJECTION INTRAVENOUS AS NEEDED
Status: DISCONTINUED | OUTPATIENT
Start: 2017-01-01 | End: 2017-01-01 | Stop reason: HOSPADM

## 2017-01-01 RX ORDER — LORAZEPAM 2 MG/ML
1 INJECTION INTRAMUSCULAR
Status: DISCONTINUED | OUTPATIENT
Start: 2017-01-01 | End: 2017-01-01 | Stop reason: HOSPADM

## 2017-01-01 RX ORDER — MORPHINE SULFATE 2 MG/ML
1 INJECTION, SOLUTION INTRAMUSCULAR; INTRAVENOUS
Status: DISCONTINUED | OUTPATIENT
Start: 2017-01-01 | End: 2017-01-01

## 2017-01-01 RX ORDER — MORPHINE SULFATE 2 MG/ML
2 INJECTION, SOLUTION INTRAMUSCULAR; INTRAVENOUS
Status: DISCONTINUED | OUTPATIENT
Start: 2017-01-01 | End: 2017-01-01 | Stop reason: HOSPADM

## 2017-01-01 RX ORDER — CELECOXIB 200 MG/1
200 CAPSULE ORAL EVERY 12 HOURS
Status: DISCONTINUED | OUTPATIENT
Start: 2017-01-01 | End: 2017-01-01 | Stop reason: SINTOL

## 2017-01-01 RX ORDER — CARVEDILOL 6.25 MG/1
6.25 TABLET ORAL 2 TIMES DAILY WITH MEALS
Status: DISCONTINUED | OUTPATIENT
Start: 2017-01-01 | End: 2017-01-01 | Stop reason: HOSPADM

## 2017-01-01 RX ORDER — PREGABALIN 50 MG/1
100 CAPSULE ORAL 3 TIMES DAILY
Status: DISCONTINUED | OUTPATIENT
Start: 2017-01-01 | End: 2017-01-01 | Stop reason: HOSPADM

## 2017-01-01 RX ORDER — ONDANSETRON 2 MG/ML
4 INJECTION INTRAMUSCULAR; INTRAVENOUS
Status: DISCONTINUED | OUTPATIENT
Start: 2017-01-01 | End: 2017-01-01 | Stop reason: HOSPADM

## 2017-01-01 RX ORDER — GLYCOPYRROLATE 0.2 MG/ML
0 INJECTION INTRAMUSCULAR; INTRAVENOUS
Status: DISCONTINUED | OUTPATIENT
Start: 2017-01-01 | End: 2017-01-01 | Stop reason: HOSPADM

## 2017-01-01 RX ORDER — ROCURONIUM BROMIDE 10 MG/ML
INJECTION, SOLUTION INTRAVENOUS AS NEEDED
Status: DISCONTINUED | OUTPATIENT
Start: 2017-01-01 | End: 2017-01-01 | Stop reason: HOSPADM

## 2017-01-01 RX ORDER — VANCOMYCIN 1.75 GRAM/500 ML IN 0.9 % SODIUM CHLORIDE INTRAVENOUS
1750 SEE ADMIN INSTRUCTIONS
Status: DISCONTINUED | OUTPATIENT
Start: 2017-01-01 | End: 2017-01-01

## 2017-01-01 RX ORDER — CEFAZOLIN SODIUM IN 0.9 % NACL 2 G/50 ML
2 INTRAVENOUS SOLUTION, PIGGYBACK (ML) INTRAVENOUS ONCE
Status: COMPLETED | OUTPATIENT
Start: 2017-01-01 | End: 2017-01-01

## 2017-01-01 RX ORDER — FLUOXETINE HYDROCHLORIDE 20 MG/1
20 CAPSULE ORAL DAILY
Status: DISCONTINUED | OUTPATIENT
Start: 2017-01-01 | End: 2017-01-01 | Stop reason: HOSPADM

## 2017-01-01 RX ORDER — VANCOMYCIN 1.75 GRAM/500 ML IN 0.9 % SODIUM CHLORIDE INTRAVENOUS
1750 SEE ADMIN INSTRUCTIONS
Status: DISCONTINUED | OUTPATIENT
Start: 2017-01-01 | End: 2017-01-01 | Stop reason: HOSPADM

## 2017-01-01 RX ORDER — FENTANYL CITRATE 50 UG/ML
100 INJECTION, SOLUTION INTRAMUSCULAR; INTRAVENOUS ONCE
Status: COMPLETED | OUTPATIENT
Start: 2017-01-01 | End: 2017-01-01

## 2017-01-01 RX ORDER — NALOXONE HYDROCHLORIDE 0.4 MG/ML
.2-.4 INJECTION, SOLUTION INTRAMUSCULAR; INTRAVENOUS; SUBCUTANEOUS
Status: DISCONTINUED | OUTPATIENT
Start: 2017-01-01 | End: 2017-01-01 | Stop reason: HOSPADM

## 2017-01-01 RX ORDER — AMOXICILLIN 250 MG
2 CAPSULE ORAL DAILY
Status: DISCONTINUED | OUTPATIENT
Start: 2017-01-01 | End: 2017-01-01 | Stop reason: HOSPADM

## 2017-01-01 RX ORDER — EPINEPHRINE 0.1 MG/ML
1 INJECTION INTRACARDIAC; INTRAVENOUS
Status: COMPLETED | OUTPATIENT
Start: 2017-01-01 | End: 2017-01-01

## 2017-01-01 RX ORDER — FAMOTIDINE 20 MG/1
20 TABLET, FILM COATED ORAL ONCE
Status: DISCONTINUED | OUTPATIENT
Start: 2017-01-01 | End: 2017-01-01 | Stop reason: HOSPADM

## 2017-01-01 RX ORDER — ETOMIDATE 2 MG/ML
INJECTION INTRAVENOUS AS NEEDED
Status: DISCONTINUED | OUTPATIENT
Start: 2017-01-01 | End: 2017-01-01 | Stop reason: HOSPADM

## 2017-01-01 RX ORDER — OXYCODONE HYDROCHLORIDE 5 MG/1
5 TABLET ORAL
Status: DISCONTINUED | OUTPATIENT
Start: 2017-01-01 | End: 2017-01-01 | Stop reason: SDUPTHER

## 2017-01-01 RX ORDER — HEPARIN SODIUM 5000 [USP'U]/ML
5000 INJECTION, SOLUTION INTRAVENOUS; SUBCUTANEOUS EVERY 8 HOURS
Status: CANCELLED | OUTPATIENT
Start: 2017-01-01

## 2017-01-01 RX ORDER — MIDAZOLAM HYDROCHLORIDE 1 MG/ML
2 INJECTION, SOLUTION INTRAMUSCULAR; INTRAVENOUS
Status: DISCONTINUED | OUTPATIENT
Start: 2017-01-01 | End: 2017-01-01 | Stop reason: HOSPADM

## 2017-01-01 RX ORDER — LIDOCAINE HYDROCHLORIDE 10 MG/ML
0.1 INJECTION INFILTRATION; PERINEURAL AS NEEDED
Status: DISCONTINUED | OUTPATIENT
Start: 2017-01-01 | End: 2017-01-01 | Stop reason: HOSPADM

## 2017-01-01 RX ADMIN — HYDROMORPHONE HYDROCHLORIDE 0.5 MG: 2 INJECTION, SOLUTION INTRAMUSCULAR; INTRAVENOUS; SUBCUTANEOUS at 16:10

## 2017-01-01 RX ADMIN — SODIUM CHLORIDE 15 ML: 900 INJECTION, SOLUTION INTRAVENOUS at 12:50

## 2017-01-01 RX ADMIN — SODIUM CHLORIDE 250 ML: 900 INJECTION, SOLUTION INTRAVENOUS at 06:12

## 2017-01-01 RX ADMIN — VANCOMYCIN HYDROCHLORIDE 1750 MG: 10 INJECTION, POWDER, LYOPHILIZED, FOR SOLUTION INTRAVENOUS at 12:11

## 2017-01-01 RX ADMIN — GLYCOPYRROLATE 0.4 MG: 0.2 INJECTION INTRAMUSCULAR; INTRAVENOUS at 15:32

## 2017-01-01 RX ADMIN — HEPARIN SODIUM 5000 UNITS: 5000 INJECTION, SOLUTION INTRAVENOUS; SUBCUTANEOUS at 01:18

## 2017-01-01 RX ADMIN — HEPARIN SODIUM 5000 UNITS: 5000 INJECTION, SOLUTION INTRAVENOUS; SUBCUTANEOUS at 11:14

## 2017-01-01 RX ADMIN — PIPERACILLIN SODIUM,TAZOBACTAM SODIUM 3.38 G: 3; .375 INJECTION, POWDER, FOR SOLUTION INTRAVENOUS at 06:12

## 2017-01-01 RX ADMIN — INSULIN LISPRO 6 UNITS: 100 INJECTION, SOLUTION INTRAVENOUS; SUBCUTANEOUS at 01:27

## 2017-01-01 RX ADMIN — TUBERCULIN PURIFIED PROTEIN DERIVATIVE 5 UNITS: 5 INJECTION, SOLUTION INTRADERMAL at 23:04

## 2017-01-01 RX ADMIN — ROCURONIUM BROMIDE 50 MG: 10 INJECTION, SOLUTION INTRAVENOUS at 13:58

## 2017-01-01 RX ADMIN — EPINEPHRINE 1 MG: 0.1 INJECTION, SOLUTION ENDOTRACHEAL; INTRACARDIAC; INTRAVENOUS at 18:02

## 2017-01-01 RX ADMIN — ROPIVACAINE HYDROCHLORIDE 30 ML: 2 INJECTION, SOLUTION EPIDURAL; INFILTRATION; PERINEURAL at 13:01

## 2017-01-01 RX ADMIN — NOREPINEPHRINE BITARTRATE 15 MCG/MIN: 1 INJECTION INTRAVENOUS at 03:20

## 2017-01-01 RX ADMIN — Medication 2 MG: at 09:46

## 2017-01-01 RX ADMIN — NALOXONE HYDROCHLORIDE 0.4 MG: 0.4 INJECTION, SOLUTION INTRAMUSCULAR; INTRAVENOUS; SUBCUTANEOUS at 01:20

## 2017-01-01 RX ADMIN — ROCURONIUM BROMIDE 10 MG: 10 INJECTION, SOLUTION INTRAVENOUS at 14:52

## 2017-01-01 RX ADMIN — FENTANYL CITRATE 25 MCG: 50 INJECTION, SOLUTION INTRAMUSCULAR; INTRAVENOUS at 14:19

## 2017-01-01 RX ADMIN — NEOSTIGMINE METHYLSULFATE 3 MG: 1 INJECTION INTRAVENOUS at 15:32

## 2017-01-01 RX ADMIN — LORAZEPAM 1 MG: 2 INJECTION, SOLUTION INTRAMUSCULAR; INTRAVENOUS at 09:47

## 2017-01-01 RX ADMIN — FENTANYL CITRATE 50 MCG: 50 INJECTION, SOLUTION INTRAMUSCULAR; INTRAVENOUS at 12:56

## 2017-01-01 RX ADMIN — VASOPRESSIN 0.01 UNITS/MIN: 20 INJECTION INTRAVENOUS at 15:49

## 2017-01-01 RX ADMIN — HYDROMORPHONE HYDROCHLORIDE 0.5 MG: 2 INJECTION, SOLUTION INTRAMUSCULAR; INTRAVENOUS; SUBCUTANEOUS at 16:18

## 2017-01-01 RX ADMIN — MORPHINE SULFATE 10 MG: 10 INJECTION INTRAMUSCULAR; INTRAVENOUS; SUBCUTANEOUS at 14:29

## 2017-01-01 RX ADMIN — FENTANYL CITRATE 25 MCG: 50 INJECTION, SOLUTION INTRAMUSCULAR; INTRAVENOUS at 14:39

## 2017-01-01 RX ADMIN — DEXTROSE MONOHYDRATE: 5 INJECTION, SOLUTION INTRAVENOUS at 06:25

## 2017-01-01 RX ADMIN — OXYCODONE HYDROCHLORIDE 10 MG: 5 TABLET ORAL at 23:03

## 2017-01-01 RX ADMIN — EPINEPHRINE 1 MG: 0.1 INJECTION, SOLUTION ENDOTRACHEAL; INTRACARDIAC; INTRAVENOUS at 17:55

## 2017-01-01 RX ADMIN — GLYCOPYRROLATE 0.46 MG: 0.2 INJECTION, SOLUTION INTRAMUSCULAR; INTRAVENOUS at 09:47

## 2017-01-01 RX ADMIN — PIPERACILLIN SODIUM,TAZOBACTAM SODIUM 3.38 G: 3; .375 INJECTION, POWDER, FOR SOLUTION INTRAVENOUS at 22:15

## 2017-01-01 RX ADMIN — MORPHINE SULFATE 10 MG: 10 INJECTION INTRAMUSCULAR; INTRAVENOUS; SUBCUTANEOUS at 10:16

## 2017-01-01 RX ADMIN — FENTANYL CITRATE 25 MCG: 50 INJECTION, SOLUTION INTRAMUSCULAR; INTRAVENOUS at 14:31

## 2017-01-01 RX ADMIN — SODIUM BICARBONATE 50 MEQ: 84 INJECTION, SOLUTION INTRAVENOUS at 18:00

## 2017-01-01 RX ADMIN — CALCIUM CHLORIDE 1 G: 100 INJECTION, SOLUTION INTRAVENOUS; INTRAVENTRICULAR at 18:00

## 2017-01-01 RX ADMIN — DEXTROSE MONOHYDRATE 25 G: 500 INJECTION PARENTERAL at 18:48

## 2017-01-01 RX ADMIN — VANCOMYCIN HYDROCHLORIDE 2 G: 1 INJECTION, POWDER, LYOPHILIZED, FOR SOLUTION INTRAVENOUS at 14:30

## 2017-01-01 RX ADMIN — CEFAZOLIN 2 G: 1 INJECTION, POWDER, FOR SOLUTION INTRAMUSCULAR; INTRAVENOUS; PARENTERAL at 13:49

## 2017-01-01 RX ADMIN — INSULIN GLARGINE 40 UNITS: 100 INJECTION, SOLUTION SUBCUTANEOUS at 23:44

## 2017-01-01 RX ADMIN — NEOMYCIN AND POLYMYXIN B SULFATES 3 ML: 40; 200000 SOLUTION IRRIGATION at 14:29

## 2017-01-01 RX ADMIN — SODIUM CHLORIDE: 9 INJECTION, SOLUTION INTRAVENOUS at 13:45

## 2017-01-01 RX ADMIN — SODIUM CHLORIDE 1 G: 900 INJECTION, SOLUTION INTRAVENOUS at 14:30

## 2017-01-01 RX ADMIN — METOPROLOL TARTRATE 5 MG: 1 INJECTION, SOLUTION INTRAVENOUS at 11:55

## 2017-01-01 RX ADMIN — INSULIN LISPRO 6 UNITS: 100 INJECTION, SOLUTION INTRAVENOUS; SUBCUTANEOUS at 06:00

## 2017-01-01 RX ADMIN — ACETAMINOPHEN 975 MG: 650 SUPPOSITORY RECTAL at 06:20

## 2017-01-01 RX ADMIN — ETOMIDATE 14 MG: 2 INJECTION INTRAVENOUS at 13:58

## 2017-01-01 RX ADMIN — ROPIVACAINE HYDROCHLORIDE 60 ML: 2 INJECTION, SOLUTION EPIDURAL; INFILTRATION at 14:30

## 2017-01-01 RX ADMIN — ACETAMINOPHEN 975 MG: 650 SUPPOSITORY RECTAL at 02:15

## 2017-01-01 RX ADMIN — Medication 10 ML: at 13:00

## 2017-01-01 RX ADMIN — DEXAMETHASONE SODIUM PHOSPHATE 10 MG: 10 INJECTION INTRAMUSCULAR; INTRAVENOUS at 15:39

## 2017-01-01 RX ADMIN — DEXTROSE MONOHYDRATE: 5 INJECTION, SOLUTION INTRAVENOUS at 18:47

## 2017-01-01 RX ADMIN — FENTANYL CITRATE 25 MCG: 50 INJECTION, SOLUTION INTRAMUSCULAR; INTRAVENOUS at 14:15

## 2017-01-01 RX ADMIN — MORPHINE SULFATE 10 MG: 10 INJECTION INTRAMUSCULAR; INTRAVENOUS; SUBCUTANEOUS at 10:01

## 2017-01-01 RX ADMIN — Medication: at 10:50

## 2017-01-01 RX ADMIN — SODIUM CHLORIDE 40 MG: 9 INJECTION INTRAMUSCULAR; INTRAVENOUS; SUBCUTANEOUS at 01:18

## 2017-01-01 RX ADMIN — OXYCODONE HYDROCHLORIDE 10 MG: 5 TABLET ORAL at 17:36

## 2017-01-01 RX ADMIN — MIDAZOLAM 2 MG: 1 INJECTION INTRAMUSCULAR; INTRAVENOUS at 12:56

## 2017-01-01 RX ADMIN — SODIUM BICARBONATE 50 MEQ: 84 INJECTION, SOLUTION INTRAVENOUS at 18:02

## 2017-01-01 RX ADMIN — NOREPINEPHRINE BITARTRATE 16 MCG/MIN: 1 INJECTION INTRAVENOUS at 15:37

## 2017-01-01 RX ADMIN — LIDOCAINE HYDROCHLORIDE 100 MG: 20 INJECTION, SOLUTION EPIDURAL; INFILTRATION; INTRACAUDAL; PERINEURAL at 13:58

## 2017-01-26 ENCOUNTER — APPOINTMENT (RX ONLY)
Dept: URBAN - METROPOLITAN AREA CLINIC 349 | Facility: CLINIC | Age: 72
Setting detail: DERMATOLOGY
End: 2017-01-26

## 2017-01-26 DIAGNOSIS — R21 RASH AND OTHER NONSPECIFIC SKIN ERUPTION: ICD-10-CM

## 2017-01-26 DIAGNOSIS — L82.1 OTHER SEBORRHEIC KERATOSIS: ICD-10-CM

## 2017-01-26 PROBLEM — L29.8 OTHER PRURITUS: Status: ACTIVE | Noted: 2017-01-26

## 2017-01-26 PROBLEM — M12.9 ARTHROPATHY, UNSPECIFIED: Status: ACTIVE | Noted: 2017-01-26

## 2017-01-26 PROCEDURE — 99213 OFFICE O/P EST LOW 20 MIN: CPT | Mod: 25

## 2017-01-26 PROCEDURE — ? COUNSELING

## 2017-01-26 PROCEDURE — ? RECOMMENDATIONS

## 2017-01-26 PROCEDURE — ? BIOPSY BY PUNCH METHOD

## 2017-01-26 PROCEDURE — 11100: CPT

## 2017-01-26 PROCEDURE — A4550 SURGICAL TRAYS: HCPCS

## 2017-01-26 ASSESSMENT — LOCATION DETAILED DESCRIPTION DERM
LOCATION DETAILED: PERIUMBILICAL SKIN
LOCATION DETAILED: LEFT ELBOW
LOCATION DETAILED: INFERIOR THORACIC SPINE

## 2017-01-26 ASSESSMENT — LOCATION SIMPLE DESCRIPTION DERM
LOCATION SIMPLE: LEFT ELBOW
LOCATION SIMPLE: UPPER BACK
LOCATION SIMPLE: ABDOMEN

## 2017-01-26 ASSESSMENT — LOCATION ZONE DERM
LOCATION ZONE: TRUNK
LOCATION ZONE: ARM

## 2017-01-26 NOTE — PROCEDURE: BIOPSY BY PUNCH METHOD
Bill 17540 For Specimen Handling/Conveyance To Laboratory?: no
Additional Anesthesia Volume In Cc (Will Not Render If 0): 0
Epidermal Sutures: 4-0 Prolene
Wound Care: Vaseline
Billing Type: Third-Party Bill
Accession #: Formal
Consent: Written consent was obtained and risks were reviewed including but not limited to scarring, infection, bleeding, scabbing, incomplete removal, nerve damage and allergy to anesthesia.
Notification Instructions: Patient will be notified of biopsy results. However, patient instructed to call the office if not contacted within 2 weeks.
Bill For Surgical Tray: yes
Biopsy Type: H and E
Anesthesia Volume In Cc (Will Not Render If 0): 1
Suture Removal: 10 days
Post-Care Instructions: I reviewed with the patient in detail post-care instructions. Patient is to keep the biopsy site dry overnight, and then apply bacitracin twice daily until healed. Patient may apply hydrogen peroxide soaks to remove any crusting.\\nAft the procedure, the patient was observed for 5-10 minutes and was oriented to person, place, and time.  Denied feeling dizzy, queasy, and stated that they did not feel that they were going to faint.
Anesthesia Type: 1% lidocaine with epinephrine
Punch Size In Mm: 4
Hemostasis: None
Detail Level: Detailed
Dressing: bandage

## 2017-01-26 NOTE — PROCEDURE: RECOMMENDATIONS
Recommendations (Free Text): Pt to continue triamcinolone cream twice daily to affected area as needed
Detail Level: Detailed

## 2017-02-02 ENCOUNTER — RX ONLY (OUTPATIENT)
Age: 72
Setting detail: RX ONLY
End: 2017-02-02

## 2017-02-02 RX ORDER — CLOBETASOL PROPIONATE 0.5 MG/G
OINTMENT TOPICAL
Qty: 1 | Refills: 2 | Status: CANCELLED
Stop reason: CLARIF

## 2017-02-08 ENCOUNTER — APPOINTMENT (RX ONLY)
Dept: URBAN - METROPOLITAN AREA CLINIC 349 | Facility: CLINIC | Age: 72
Setting detail: DERMATOLOGY
End: 2017-02-08

## 2017-02-08 DIAGNOSIS — L57.0 ACTINIC KERATOSIS: ICD-10-CM

## 2017-02-08 DIAGNOSIS — L40.0 PSORIASIS VULGARIS: ICD-10-CM

## 2017-02-08 PROBLEM — H91.90 UNSPECIFIED HEARING LOSS, UNSPECIFIED EAR: Status: ACTIVE | Noted: 2017-02-08

## 2017-02-08 PROBLEM — K21.9 GASTRO-ESOPHAGEAL REFLUX DISEASE WITHOUT ESOPHAGITIS: Status: ACTIVE | Noted: 2017-02-08

## 2017-02-08 PROBLEM — E13.9 OTHER SPECIFIED DIABETES MELLITUS WITHOUT COMPLICATIONS: Status: ACTIVE | Noted: 2017-02-08

## 2017-02-08 PROBLEM — L20.84 INTRINSIC (ALLERGIC) ECZEMA: Status: ACTIVE | Noted: 2017-02-08

## 2017-02-08 PROBLEM — F32.9 MAJOR DEPRESSIVE DISORDER, SINGLE EPISODE, UNSPECIFIED: Status: ACTIVE | Noted: 2017-02-08

## 2017-02-08 PROBLEM — N18.6 END STAGE RENAL DISEASE: Status: ACTIVE | Noted: 2017-02-08

## 2017-02-08 PROBLEM — J30.1 ALLERGIC RHINITIS DUE TO POLLEN: Status: ACTIVE | Noted: 2017-02-08

## 2017-02-08 PROBLEM — I10 ESSENTIAL (PRIMARY) HYPERTENSION: Status: ACTIVE | Noted: 2017-02-08

## 2017-02-08 PROBLEM — M12.9 ARTHROPATHY, UNSPECIFIED: Status: ACTIVE | Noted: 2017-02-08

## 2017-02-08 PROCEDURE — ? SUTURE REMOVAL (GLOBAL PERIOD)

## 2017-02-08 PROCEDURE — 17000 DESTRUCT PREMALG LESION: CPT

## 2017-02-08 PROCEDURE — ? RECOMMENDATIONS

## 2017-02-08 PROCEDURE — ? COUNSELING

## 2017-02-08 PROCEDURE — 17003 DESTRUCT PREMALG LES 2-14: CPT

## 2017-02-08 PROCEDURE — ? LIQUID NITROGEN

## 2017-02-08 PROCEDURE — 99024 POSTOP FOLLOW-UP VISIT: CPT

## 2017-02-08 ASSESSMENT — LOCATION SIMPLE DESCRIPTION DERM
LOCATION SIMPLE: LEFT FOREARM
LOCATION SIMPLE: RIGHT POSTERIOR UPPER ARM
LOCATION SIMPLE: RIGHT PRETIBIAL REGION
LOCATION SIMPLE: LEFT ELBOW
LOCATION SIMPLE: LEFT PRETIBIAL REGION
LOCATION SIMPLE: RIGHT HAND

## 2017-02-08 ASSESSMENT — LOCATION ZONE DERM
LOCATION ZONE: ARM
LOCATION ZONE: LEG
LOCATION ZONE: HAND

## 2017-02-08 ASSESSMENT — PAIN INTENSITY VAS: HOW INTENSE IS YOUR PAIN 0 BEING NO PAIN, 10 BEING THE MOST SEVERE PAIN POSSIBLE?: NO PAIN

## 2017-02-08 ASSESSMENT — LOCATION DETAILED DESCRIPTION DERM
LOCATION DETAILED: LEFT PROXIMAL PRETIBIAL REGION
LOCATION DETAILED: RIGHT DISTAL PRETIBIAL REGION
LOCATION DETAILED: LEFT PROXIMAL DORSAL FOREARM
LOCATION DETAILED: RIGHT RADIAL DORSAL HAND
LOCATION DETAILED: RIGHT DISTAL POSTERIOR UPPER ARM
LOCATION DETAILED: LEFT ELBOW

## 2017-02-08 NOTE — PROCEDURE: LIQUID NITROGEN
Render Post-Care Instructions In Note?: no
Number Of Freeze-Thaw Cycles: 2 freeze-thaw cycles
Detail Level: Detailed
Post-Care Instructions: I reviewed with the patient in detail post-care instructions. Patient is to wear sunprotection, and avoid picking at any of the treated lesions. Pt may apply Vaseline to crusted or scabbing areas.
Duration Of Freeze Thaw-Cycle (Seconds): 4
Consent: The patient's consent was obtained including but not limited to risks of crusting, scabbing, blistering, scarring, darker or lighter pigmentary change, recurrence, incomplete removal and infection.

## 2017-02-08 NOTE — PROCEDURE: COUNSELING
Quality 337: Tuberculosis Prevention For Psoriasis And Psoriatic Arthritis Patients On A Biological Immune Response Modifier: No documentation of negative or managed positive TB screen
Detail Level: Generalized
Detail Level: Detailed

## 2017-02-08 NOTE — PROCEDURE: SUTURE REMOVAL (GLOBAL PERIOD)
Add 43261 Cpt? (Important Note: In 2017 The Use Of 60517 Is Being Tracked By Cms To Determine Future Global Period Reimbursement For Global Periods): yes
Detail Level: Detailed

## 2017-02-08 NOTE — PROCEDURE: RECOMMENDATIONS
Recommendations (Free Text): Continue triamcinolone cream twice a day \\nCerave moisturizer twice a day
Detail Level: Detailed

## 2017-02-24 NOTE — H&P
[address] [facility]  History and Physical Exam    Patient ID:  Damian Nieto  [id number]    74 y.o.  1945    Today: February 24, 2017    Vitals Signs: Reviewed as noted in medical record. Allergies: Allergies   Allergen Reactions    Iodinated Contrast Media - Oral And Iv Dye Hives and Itching       CC: Right knee pain, infected TKA    HPI:  Pt complains of right knee pain and with difficulty ambulating. Pain for 1 week, swelling and pain. Relevant PMH:   Past Medical History:   Diagnosis Date    Aortic stenosis 7/8/14    mild to moderate    Arthritis     osteoarthritis - generalized     Chronic kidney disease     Stage 4    Chronic pain     right knee    Diabetes (HCC) dx 1985    Type 2 -140  Hypoglycemia sx @ 79    GERD (gastroesophageal reflux disease)     controlled with meds     GERD (gastroesophageal reflux disease) 7/18/2014    Hypertension      controlled with medication    Morbid obesity ([address] 6/30/14    BMI- 40.9    MRSA infection     multiple times in toes    Psychiatric disorder     PVD (peripheral vascular disease) ([address]     Thyroid disease     hypothyroid       Objective:                    HEENT: NC/AT                   Lungs:  clear                   Heart:   rrr                   Abdomen: soft                   Extremities:  Pain with rom of the right knee    Radiographs: reveal TKA    Assessment: Presence of artificial knee joint, right [Z96.651]    Plan:  Proceed with scheduled Procedure(s) (LRB):  KNEE ARTHROPLASTY TOTAL REVISION/ RIGHT/ AARON (Right) . The patient has failed conservative treatment including NSAIDS, and injections. Due to the amount of pain the patient is experiencing we will proceed with scheduled procedure.       Signed By: DOMENIC Gutierrez  February 24, 2017  HARVEY

## 2017-02-25 NOTE — PROGRESS NOTES
PATIENT WAS TRANSPORTED FROM ED TO RADIOLOGY IN [facility] SHE WAS PUT IN UPON ARRIVAL- FOR A HIP AND PELVIS XRAY. WHEN PATIENT ARRIVED, WE TRIED TO STAND HER UP TO GET HER ON THE X-RAY TABLE AND SHE WAS UNABLE TO. SHE WAS UNSTABLE AND SHAKING AND UNABLE TO BEAR WEIGHT ON HER LEGS. WE ASKED THE TRANSPORTER TO TAKE HER BACK TO THE ER AND HAVE THE NURSE TRANSFER HER TO A STRETCHER SO WE COULD SLIDE HER TO THE X-RAY TABLE. SHE TOOK HER BACK AND ASKED THE NURSE TO TRANSFER HER TO A STRETCHER BUT THE NURSE DID NOT AND LEFT THE PATIENT IN THE WHEELCHAIR. WE ASKED AGAIN TO HAVE HER MOVED TO A STRETCHER AND SHE IS CURRENTLY PENDING ARRIVAL BACK TO THE DEPARTMENT FOR X-RAY AFTER WE CALLED AND ASKED AGAIN FOR TRANSFER.

## 2017-02-25 NOTE — ED TRIAGE NOTES
Patient is unable to ambulate due to r knee pain (which she has surgery scheduled Monday), Patient was transported by [facility] for dialysis.

## 2017-02-25 NOTE — ED NOTES
I have reviewed discharge instructions with the spouse. The spouse verbalized understanding. Pt given RX for following medications at discharge [unfilled]. Pt discharged with Marshal Espinoza from emergency department in no acute distress.

## 2017-02-25 NOTE — ED PROVIDER NOTES
HPI Comments: 77-year-old female presents to the emergency room today with complaints of right knee pain and inability to ambulate. She reports that she has scheduled a right total knee revision for 3 days from now. She was supposed to be at her dialysis treatment and found in today and in on Monday in preparation for the surgery on Tuesday. Patient is a fever, vomiting, dizziness. No chest pain or shortness of breath. Denies fever    Patient's spouse reports that the dialysis was supposed to help arrange ambulance transport for the patient, however, there was no progress on this throughout the entire week. The patient and  contacted the on-call orthopedic doctor, who suggested coming to the ER. Patient is a 70 y.o. female presenting with other event. The history is provided by the patient and the spouse. Other   This is a recurrent problem. The current episode started more than 1 week ago. The problem occurs constantly. The problem has been gradually worsening. Pertinent negatives include no chest pain, no abdominal pain, no headaches and no shortness of breath. The symptoms are aggravated by standing. The symptoms are relieved by rest. Treatments tried: Norco. The treatment provided mild relief.         Past Medical History:   Diagnosis Date    Aortic stenosis 7/8/14    mild to moderate    Arthritis     osteoarthritis - generalized     Chronic kidney disease     Stage 4    Chronic pain     right knee    Diabetes ([facility] dx 1985    Type 2 -140  Hypoglycemia sx @ 79    GERD (gastroesophageal reflux disease)     controlled with meds     GERD (gastroesophageal reflux disease) 7/18/2014    Hypertension      controlled with medication    Morbid obesity ([address] 6/30/14    BMI- 40.9    MRSA infection     multiple times in toes    Psychiatric disorder     PVD (peripheral vascular disease) ([facility]     Thyroid disease     hypothyroid       Past Surgical History:   Procedure Laterality Date  HX CATARACT REMOVAL Bilateral     HX HEENT Left     laser surgery     HX OPEN CHOLECYSTECTOMY  1970's    HX ORTHOPAEDIC Left 2002    foot fx repair with hardware     HX ORTHOPAEDIC Left 2006    great toe amputation  , left second toe amputation    HX ORTHOPAEDIC Left 2011     ankle tendon release     HX RETINAL DETACHMENT REPAIR Right 2/2014    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    HX RETINAL DETACHMENT REPAIR Right 2012    HX UROLOGICAL Right 1992    nephrectomy     HX VASCULAR ACCESS  1/2014    A-V shunt left upper arm    VASCULAR SURGERY PROCEDURE UNLIST      L femoral stent          Family History:   Problem Relation Age of Onset    Diabetes Mother     Hypertension Mother     Diabetes Father     Hypertension Father     Heart Attack Sister     Heart Attack Brother     Heart Attack Paternal Aunt     Heart Attack Paternal Uncle     Malignant Hyperthermia Neg Hx     Pseudocholinesterase Deficiency Neg Hx     Delayed Awakening Neg Hx     Post-op Nausea/Vomiting Neg Hx     Emergence Delirium Neg Hx     Post-op Cognitive Dysfunction Neg Hx     Other Neg Hx        Social History     Social History    Marital status:      Spouse name: N/A    Number of children: N/A    Years of education: N/A     Occupational History    Not on file. Social History Main Topics    Smoking status: Former Smoker     Packs/day: 1.50     Years: 20.00    Smokeless tobacco: Never Used      Comment: quit 21 years ago    Alcohol use No      Comment: quit 20 years ago     Drug use: No    Sexual activity: Not on file     Other Topics Concern    Not on file     Social History Narrative    Lives with     Currently using w/c and walker for mobility        PCP: Dr. Drew Turcios    Cardiology: Dr. Luisana Rutledge: Iodinated contrast media - oral and iv dye    Review of Systems   Constitutional: Negative for chills and fever. HENT: Negative for congestion, ear pain and rhinorrhea.     Eyes: Negative for photophobia and discharge. Respiratory: Negative for cough and shortness of breath. Cardiovascular: Negative for chest pain and palpitations. Gastrointestinal: Negative for abdominal pain, constipation, diarrhea and vomiting. Endocrine: Negative for cold intolerance and heat intolerance. Genitourinary: Negative for dysuria and flank pain. Musculoskeletal: Positive for arthralgias. Negative for myalgias and neck pain. Skin: Negative for rash and wound. Allergic/Immunologic: Negative for environmental allergies and food allergies. Neurological: Negative for syncope and headaches. Hematological: Negative for adenopathy. Does not bruise/bleed easily. Psychiatric/Behavioral: Negative for dysphoric mood. The patient is not nervous/anxious. All other systems reviewed and are negative. Vitals:    02/25/17 0929   BP: 105/52   Pulse: 100   Resp: 16   Temp: 98.3 °F (36.8 °C)   SpO2: 91%   Weight: 105.2 kg (232 lb)   Height: 5' 6\" (1.676 m)            Physical Exam   Constitutional: She is oriented to person, place, and time. She appears well-developed and well-nourished. She appears distressed. HENT:   Head: Normocephalic and atraumatic. Mouth/Throat: Oropharynx is clear and moist.   Eyes: Conjunctivae and EOM are normal. Pupils are equal, round, and reactive to light. Right eye exhibits no discharge. Left eye exhibits no discharge. No scleral icterus. Neck: Normal range of motion. Neck supple. No thyromegaly present. Cardiovascular: Normal rate, regular rhythm, normal heart sounds and intact distal pulses. Exam reveals no gallop and no friction rub. No murmur heard. Pulmonary/Chest: Effort normal and breath sounds normal. No respiratory distress. She has no wheezes. She exhibits no tenderness. Abdominal: Soft. Bowel sounds are normal. She exhibits no distension. There is no hepatosplenomegaly. There is no tenderness. There is no rebound and no guarding. No hernia. Musculoskeletal: Normal range of motion. She exhibits edema. She exhibits no tenderness. Right knee reveals an old surgical scar. There is significant postoperative and degenerative changes noted    I don't appreciate any large effusions. There is no erythema or warmth. Range of motion is limited by pain. Dependent edema as prominent. Neurological: She is alert and oriented to person, place, and time. No cranial nerve deficit. She exhibits normal muscle tone. Skin: Skin is warm and dry. No rash noted. She is not diaphoretic. No erythema. Psychiatric: She has a normal mood and affect. Her behavior is normal. Judgment and thought content normal.   Nursing note and vitals reviewed. MDM  Number of Diagnoses or Management Options  End stage renal disease ([facility]):   Pain and swelling of right knee: established and worsening  Diagnosis management comments: 59-year-old female with  worsening right knee pain. Scheduled for revision of her total knee replacement on 2/28. Recent arthrocentesis culture revealed only light staph aureus growth. Essentially, patient is having worsening problems with ambulation  stated that he was unable to transfer her for her dialysis treatment today. I will go ahead and check lab work today and an x-ray after which time I will discuss the case with the on-call orthopedist, as well as renal.  3  1:40 PM  Case is discussed with Dr. Kayla Titus. At this point no intervention needed from an orthopedic standpoint. He advised that if the patient develops any worsening signs of infection, fever, vomiting that she should return to the ER. Otherwise, they will have definitive treatment for the knee infection in the ER on Tuesday.          Amount and/or Complexity of Data Reviewed  Clinical lab tests: ordered and reviewed  Tests in the radiology section of CPT®: ordered and reviewed  Review and summarize past medical records: yes    Risk of Complications, Morbidity, and/or Mortality  Presenting problems: moderate  Diagnostic procedures: moderate  Management options: low  General comments: Elements of this note have been dictated via voice recognition software. Text and phrases may be limited by the accuracy of the software. The chart has been reviewed, but errors may still be present.       Patient Progress  Patient progress: improved    ED Course       Procedures

## 2017-02-26 NOTE — PROGRESS NOTES
[facility] contacted to assist with stretcher transport for pt to go to her HD appt Monday 2/27 at 10 am at the [facility] as she cannot ambulate at present and will otherwise miss treatment and she is scheduled for ortho surgery at  next week on Tues 2/28/17 with pre assessment Mon 2/27/17 at 3pm per [address] [facility] scheduling. SHAMIKA spoke with Wallace Carroll at [facility] and  transport scheduled to dialysis Monday am then from there to [facility] Out pt. Pt/spouse updated and if additional transports needed Charna Schilder will need to be contacted with additional instructions.

## 2017-02-26 NOTE — PROGRESS NOTES
Transition of Care Discharge Follow-up Questionnaire   Date/Time of Call:   2/26/17 12:59p   What was the patient hospitalized for? Pain and swelling of right knee   Does the patient understand his/her diagnosis and/or treatment and what happened during the hospitalization? Patients spouse verbalized understanding of treatment and diagnosis. Did the patient receive discharge instructions? Yes   Review any discharge instructions (see notes in [facility]). Ask patient if they understand these. Do they have any questions? He verbalized understanding of instructions. Were home services ordered (nursing, PT, OT, ST, etc.)? No   If so, has the first visit occurred? If not, why? (Assist with coordination of services if necessary.) n/a   Was any DME ordered? No. Patient has a w/c and bars in the bathroom. If so, has it been received? If not, why?  (Assist with coordination of arranging DME orders if necessary.) n/a   Complete a review of all medications (new, continued and discontinued meds per the D/C instructions and medication tab in [facility]). Patients spouse and care coordinator reviewed current medications. Were all new prescriptions filled? If not, why?  (Assist with obtainment of medications if necessary.) Yes   Does the patient understand the purpose and dosing instructions for all medications? (If patient has questions, provide explanation and education.) Yes   Does the patient have any problems in performing ADLs? (If patient is unable to perform ADLs  what is the limiting factor(s)? Do they have a support system that can assist? If no support system is present, discuss possible assistance that they may be able to obtain.) Patient is dependent on spouse for assistance with ADLs. Patient is in a w/c and requires assistance to bathroom and with transferring needs. Spouse and family members provide support.  Patient is not urinating a lot but will have follow-up with dialysis physician tomorrow. Does the patient have all follow-up appointments scheduled? Has transportation been arranged? [facility] follow-up should be within 7 days of discharge; all others should have PCP follow-up within 7 days of discharge; follow-ups with other specialists as appropriate or ordered.) Patient has f/u with [facility] physician tomorrow. Any other questions or concerns expressed by the patient? No concerns or questions expressed by patients spouse to care coordinator. He states patient will be transported [facility] following dialysis tomorrow via transport. SANDRO Call Completed By: Babs Lutz LPN  [facility] [address] Coordinator          This note will not be viewable in [address]

## 2017-02-27 PROBLEM — T84.59XA INFECTED PROSTHETIC KNEE JOINT (HCC): Status: ACTIVE | Noted: 2017-01-01

## 2017-02-27 PROBLEM — Z96.659 INFECTED PROSTHETIC KNEE JOINT (HCC): Status: ACTIVE | Noted: 2017-01-01

## 2017-02-27 NOTE — PROGRESS NOTES
Pt has a small weeping wound on left later calf. First and second toes on left foot are darkened and sore per pt. Pt also has small scabbed wound on right lateral calf, and the first and second toes on right foot are also sore.

## 2017-02-28 PROBLEM — Z79.4 TYPE 2 DIABETES MELLITUS WITH CHRONIC KIDNEY DISEASE ON CHRONIC DIALYSIS, WITH LONG-TERM CURRENT USE OF INSULIN (HCC): Status: ACTIVE | Noted: 2017-01-01

## 2017-02-28 PROBLEM — N18.6 TYPE 2 DIABETES MELLITUS WITH CHRONIC KIDNEY DISEASE ON CHRONIC DIALYSIS, WITH LONG-TERM CURRENT USE OF INSULIN (HCC): Status: ACTIVE | Noted: 2017-01-01

## 2017-02-28 PROBLEM — R41.89 SEDATED: Status: RESOLVED | Noted: 2017-01-01 | Resolved: 2017-01-01

## 2017-02-28 PROBLEM — Z99.2 TYPE 2 DIABETES MELLITUS WITH CHRONIC KIDNEY DISEASE ON CHRONIC DIALYSIS, WITH LONG-TERM CURRENT USE OF INSULIN (HCC): Status: ACTIVE | Noted: 2017-01-01

## 2017-02-28 PROBLEM — R00.0 TACHYCARDIA: Status: ACTIVE | Noted: 2017-01-01

## 2017-02-28 PROBLEM — D63.8 ANEMIA OF CHRONIC DISEASE: Status: ACTIVE | Noted: 2017-01-01

## 2017-02-28 PROBLEM — R41.89 SEDATED: Status: ACTIVE | Noted: 2017-01-01

## 2017-02-28 PROBLEM — E11.22 TYPE 2 DIABETES MELLITUS WITH CHRONIC KIDNEY DISEASE ON CHRONIC DIALYSIS, WITH LONG-TERM CURRENT USE OF INSULIN (HCC): Status: ACTIVE | Noted: 2017-01-01

## 2017-02-28 PROBLEM — G93.40 ACUTE ENCEPHALOPATHY: Status: ACTIVE | Noted: 2017-01-01

## 2017-02-28 NOTE — PERIOP NOTES
Informed Dr Dwayne Elena of pt's change in VS, labs drawn last PM, chronic kidney disease and on hemodialysis. He states will talk to Dr Robin Leggett re: plan of care.

## 2017-02-28 NOTE — PERIOP NOTES
TRANSFER - OUT REPORT:    Verbal report given to [facility] RN on Collin Sloan  being transferred to [facility] for routine progression of care       Report consisted of patients Situation, Background, Assessment and   Recommendations(SBAR). Information from the following report(s) SBAR was reviewed with the receiving nurse. Lines:   Peripheral IV 02/28/17 Right; Inner Arm (Active)   Site Assessment Clean, dry, & intact 2/28/2017  4:04 PM   Phlebitis Assessment 0 2/28/2017  4:04 PM   Infiltration Assessment 0 2/28/2017  4:04 PM   Dressing Status Clean, dry, & intact 2/28/2017  4:04 PM   Dressing Type Tape;Transparent 2/28/2017  4:04 PM   Hub Color/Line Status Green; Infusing 2/28/2017  4:04 PM        Opportunity for questions and clarification was provided.       Patient transported with:   O2 @ 2 liters

## 2017-02-28 NOTE — ANESTHESIA PROCEDURE NOTES
Peripheral Block    Start time: 2/28/2017 12:57 PM  End time: 2/28/2017 1:01 PM  Performed by: Natalie Hebert  Authorized by: Lillian ESCALANTE       Pre-procedure: Indications: at surgeon's request, post-op pain management and procedure for pain    Preanesthetic Checklist: patient identified, risks and benefits discussed, site marked, timeout performed, anesthesia consent given and patient being monitored    Timeout Time: 12:56          Block Type:   Block Type:   Adductor canal  Laterality:  Right  Monitoring:  Standard ASA monitoring, responsive to questions, continuous pulse ox, oxygen, frequent vital sign checks and heart rate  Injection Technique:  Single shot  Procedures: ultrasound guided    Patient Position: prone  Prep: chlorhexidine    Location:  Mid thigh  Needle Type:  Stimuplex  Needle Gauge:  22 G  Needle Localization:  Ultrasound guidance  Medication Injected:  0.2%  ropivacaine  Adds:  Epi 1:200K  Volume (mL):  30    Assessment:  Number of attempts:  1  Injection Assessment:  Incremental injection every 5 mL, no paresthesia, ultrasound image on chart, local visualized surrounding nerve on ultrasound, negative aspiration for blood and no intravascular symptoms  Patient tolerance:  Patient tolerated the procedure well with no immediate complications

## 2017-02-28 NOTE — ADVANCED PRACTICE NURSE
Total Joint Surgery Preoperative Chart Review      Patient ID:  Dallas Tapia  [id number]  40 y.o.  1945  Surgeon: Dr. Estrella Sykes  Date of Surgery: 2/28/2017  Procedure: Total Right Knee Arthroplasty  Primary Care Physician: Becka Reynoso [phone]  Specialty Physician(s):  Nephrology    Subjective:   Dallas Tapia is a 70 y.o. WHITE OR  female who presents for preoperative evaluation for Total Right Knee arthroplasty. This is a preoperative chart review note based on data collected by the nurse at the surgical Pre-Assessment visit.     Past Medical History:   Diagnosis Date    Aortic stenosis 7/8/14    mild to moderate    Arthritis     osteoarthritis - generalized     Chronic kidney disease     Stage 4    Chronic pain     right knee    Diabetes ([address] dx 1985    Type 2 -140  Hypoglycemia sx @ 79    GERD (gastroesophageal reflux disease)     controlled with meds     GERD (gastroesophageal reflux disease) 7/18/2014    Hypertension      controlled with medication    Morbid obesity ([address] 6/30/14    BMI- 40.9    MRSA infection     multiple times in toes    Psychiatric disorder     PVD (peripheral vascular disease) ([address]     Thyroid disease     hypothyroid      Past Surgical History:   Procedure Laterality Date    HX CATARACT REMOVAL Bilateral     HX HEENT Left     laser surgery     HX OPEN CHOLECYSTECTOMY  1970's    HX ORTHOPAEDIC Left 2002    foot fx repair with hardware     HX ORTHOPAEDIC Left 2006    great toe amputation  , left second toe amputation    HX ORTHOPAEDIC Left 2011     ankle tendon release     HX RETINAL DETACHMENT REPAIR Right 2/2014    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    HX RETINAL DETACHMENT REPAIR Right 2012    HX UROLOGICAL Right 1992    nephrectomy     HX VASCULAR ACCESS  1/2014    A-V shunt left upper arm    VASCULAR SURGERY PROCEDURE UNLIST      L femoral stent      Family History   Problem Relation Age of Onset    Diabetes Mother    Aundria Dadds Hypertension Mother     Diabetes Father     Hypertension Father     Heart Attack Sister     Heart Attack Brother     Heart Attack Paternal Aunt     Heart Attack Paternal Uncle     Malignant Hyperthermia Neg Hx     Pseudocholinesterase Deficiency Neg Hx     Delayed Awakening Neg Hx     Post-op Nausea/Vomiting Neg Hx     Emergence Delirium Neg Hx     Post-op Cognitive Dysfunction Neg Hx     Other Neg Hx       Social History   Substance Use Topics    Smoking status: Former Smoker     Packs/day: 1.50     Years: 20.00    Smokeless tobacco: Never Used      Comment: quit 21 years ago    Alcohol use No      Comment: quit 20 years ago        Prior to Admission medications    Medication Sig Start Date End Date Taking? Authorizing Provider   silver sulfADIAZINE (SILVADENE) 1 % topical cream Apply  to affected area daily. 12/15/16   Radha Walsh MD   carvedilol (COREG) 12.5 mg tablet Take 2 Tabs by mouth two (2) times daily (with meals). Indications: Hypertension 12/15/16   Radha Walsh MD   cloNIDine HCl (CATAPRES) 0.1 mg tablet Take 1 Tab by mouth two (2) times a day. 12/15/16   Radha Walsh MD   dilTIAZem XR (DILACOR XR) 180 mg XR capsule Take 1 Cap by mouth daily. 12/15/16   Radha Walsh MD   folic acid (FOLVITE) 1 mg tablet Take 1 Tab by mouth daily. 12/15/16   Radha Walsh MD   FLUoxetine (PROZAC) 20 mg capsule Take 1 Cap by mouth daily. Indications: DEPRESSION ASSOCIATED WITH BIPOLAR DISORDER 12/15/16   Radha Walsh MD   irbesartan (AVAPRO) 300 mg tablet Take 1 Tab by mouth nightly. 12/15/16   Radha Walsh MD   levothyroxine (SYNTHROID) 50 mcg tablet Take 1 Tab by mouth Daily (before breakfast). Indications: hypothyroidism 12/15/16   Radha Walsh MD   omeprazole (PRILOSEC) 20 mg capsule Take 1 Cap by mouth daily. Indications: GASTRIC HYPERSECRETION WITH SYSTEMIC MASTOCYTOSIS, am 12/15/16   Radha Walsh MD   rOPINIRole (REQUIP) 0.5 mg tablet Take 1 Tab by mouth nightly.  Indications: Restless Legs Syndrome 12/15/16   Carol Mckeon MD   LORazepam (ATIVAN) 0.5 mg tablet Take 1 Tab by mouth three (3) times daily. Max Daily Amount: 1.5 mg. 12/15/16   Carol Mckeon MD   sevelamer carbonate (RENVELA) 800 mg tab tab Take 1 Tab by mouth two (2) times a day. Indications: RENAL OSTEODYSTROPHY WITH HYPERPHOSPHATEMIA 12/15/16   Carol Mckeon MD   furosemide (LASIX) 80 mg tablet Take 1 Tab by mouth two (2) times a day. Indications: Edema 12/15/16   Carol Mckeon MD   pregabalin (LYRICA) 100 mg capsule Take 1 Cap by mouth three (3) times daily. Max Daily Amount: 300 mg. 12/15/16   Carol Mckeon MD   traMADol [employer]) 50 mg tablet Take 1 Tab by mouth two (2) times a day. Max Daily Amount: 100 mg. 12/15/16   Carol Mckeon MD   HYDROcodone-acetaminophen [facility]) 7.5-325 mg per tablet Take 1 Tab by mouth two (2) times a day. Max Daily Amount: 2 Tabs. 12/15/16   Carol Mckeon MD   HYDROcodone-acetaminophen [facility]) 7.5-325 mg per tablet Take 1 Tab by mouth two (2) times a day. Max Daily Amount: 2 Tabs. 2/15/17   Carol Mckeon MD   HYDROcodone-acetaminophen [facility]) 7.5-325 mg per tablet Take 1 Tab by mouth two (2) times a day. Max Daily Amount: 2 Tabs. 1/15/17   Carol Mckeon MD   trimethoprim-sulfamethoxazole (BACTRIM DS, SEPTRA DS) 160-800 mg per tablet Take 1 Tab by mouth two (2) times a day. 11/18/16   Carol Mckeon MD   meloxicam (MOBIC) 7.5 mg tablet Take 1 Tab by mouth daily. 10/6/16   Carol Mckeon MD   HYDROcodone-homatropine (HYCODAN) 5-1.5 mg/5 mL (5 mL) syrup Take 5 mL by mouth four (4) times daily. Max Daily Amount: 20 mL. 10/6/16   Carol Mckeon MD   ciprofloxacin HCl (CIPRO) 500 mg tablet Take 1 Tab by mouth two (2) times a day. 10/6/16   Carol Mckeon MD   doxycycline (MONODOX) 100 mg capsule Take 1 Cap by mouth two (2) times a day. 6/1/15   Cholo Vallecillo NP   ginkgo biloba 120 mg tab Take  by mouth. Historical Provider   doxazosin (CARDURA) 4 mg tablet 4 mg daily.  5/11/15   Historical Provider   trimethoprim (TRIMPEX) 100 mg tablet Take 1 Tab by mouth daily. 5/27/15   Leidy Taylor NP   aspirin (ASPIRIN) 325 mg tablet Take 325 mg by mouth daily. Historical Provider   docusate sodium (STOOL SOFTENER) 100 mg capsule Take 1 capsule by mouth daily. 8/27/14   Dorcas Garcia NP   acetaminophen (TYLENOL EXTRA STRENGTH) 500 mg tablet Take 1.5 tablets by mouth every six (6) hours as needed. Take if needed DOS per anesthesia protocol. 8/27/14   Dorcas Garcia NP   cholecalciferol, VITAMIN D3, (VITAMIN D3) 5,000 unit tab tablet Take 1 tablet by mouth daily (after breakfast). 8/27/14   Dorcas Garcia NP   diphenhydrAMINE (BENADRYL) 25 mg capsule Take 1 capsule by mouth as needed. Take if needed DOS per anesthesia protocol. 8/27/14   Dorcas Garcia NP   vit B Cmplx 3-FA-Vit C-Biotin (VOL-CARE RX) 1- mg-mg-mcg tablet Take 1 tablet by mouth daily. Rain Olivares MD   insulin glargine (LANTUS) 100 unit/mL injection 70 Units by SubCUTAneous route nightly. Indications: DIABETES MELLITUS 8/2/14   Sara Mcclain MD     Allergies   Allergen Reactions    Iodinated Contrast Media - Oral And Iv Dye Hives and Itching          Objective:     Physical Exam:   No data found. ECG:    EKG Results     None          Data Review:   Labs:   Recent Results (from the past 24 hour(s))   MSSA/MRSA SC BY PCR, NASAL SWAB    Collection Time: 02/27/17  5:17 PM   Result Value Ref Range    Special Requests: NO SPECIAL REQUESTS      Culture result: (A)       MRSA target DNA not detected, SA target DNA detected. A MRSA negative, SA positive test result does not preclude MRSA nasal colonization.    TYPE & SCREEN    Collection Time: 02/27/17  7:19 PM   Result Value Ref Range    Crossmatch Expiration 03/02/2017     ABO/Rh(D) O POSITIVE     Antibody screen NEG     Unit number [id number]     Blood component type RC LR AS5     Unit division 00     Status of unit ALLOCATED     Crossmatch result Compatible     Unit number [id number]     Blood component type RC LR AS5     Unit division 00     Status of unit ALLOCATED     Crossmatch result Compatible    CBC WITH AUTOMATED DIFF    Collection Time: 02/27/17  7:28 PM   Result Value Ref Range    WBC 5.5 4.3 - 11.1 K/uL    RBC 2.91 (L) 4.05 - 5.25 M/uL    HGB 9.5 (L) 11.7 - 15.4 g/dL    HCT 29.7 (L) 35.8 - 46.3 %    .1 (H) 79.6 - 97.8 FL    MCH 32.6 26.1 - 32.9 PG    MCHC 32.0 31.4 - 35.0 g/dL    RDW 14.3 11.9 - 14.6 %    PLATELET 675 (L) 551 - 450 K/uL    MPV 12.0 10.8 - 14.1 FL    DF AUTOMATED      NEUTROPHILS 78 43 - 78 %    LYMPHOCYTES 11 (L) 13 - 44 %    MONOCYTES 10 4.0 - 12.0 %    EOSINOPHILS 1 0.5 - 7.8 %    BASOPHILS 0 0.0 - 2.0 %    IMMATURE GRANULOCYTES 0.2 0.0 - 5.0 %    ABS. NEUTROPHILS 4.3 1.7 - 8.2 K/UL    ABS. LYMPHOCYTES 0.6 0.5 - 4.6 K/UL    ABS. MONOCYTES 0.6 0.1 - 1.3 K/UL    ABS. EOSINOPHILS 0.1 0.0 - 0.8 K/UL    ABS. BASOPHILS 0.0 0.0 - 0.2 K/UL    ABS. IMM.  GRANS. 0.0 0.0 - 0.5 K/UL   PROTHROMBIN TIME + INR    Collection Time: 02/27/17  7:28 PM   Result Value Ref Range    Prothrombin time 12.5 (H) 9.6 - 12.0 sec    INR 1.2 0.9 - 1.2     PTT    Collection Time: 02/27/17  7:28 PM   Result Value Ref Range    aPTT 43.2 (H) 25.3 - 57.5 SEC   METABOLIC PANEL, BASIC    Collection Time: 02/27/17  7:28 PM   Result Value Ref Range    Sodium 135 (L) 136 - 145 mmol/L    Potassium 3.4 (L) 3.5 - 5.1 mmol/L    Chloride 95 (L) 98 - 107 mmol/L    CO2 29 21 - 32 mmol/L    Anion gap 11 7 - 16 mmol/L    Glucose 111 (H) 65 - 100 mg/dL    BUN 27 (H) 8 - 23 MG/DL    Creatinine 5.08 (H) 0.6 - 1.0 MG/DL    GFR est AA 11 (L) >60 ml/min/1.73m2    GFR est non-AA 9 (L) >60 ml/min/1.73m2    Calcium 8.7 8.3 - 10.4 MG/DL   CRP, HIGH SENSITIVITY    Collection Time: 02/27/17  7:28 PM   Result Value Ref Range    CRP, High sensitivity >190.0 mg/L   SED RATE, AUTOMATED    Collection Time: 02/27/17  7:28 PM   Result Value Ref Range    Sed rate, automated 109 (H) 0 - 30 mm/hr   GLUCOSE, POC    Collection Time: 02/27/17 8:07 PM   Result Value Ref Range    Glucose (POC) 132 (H) 65 - 100 mg/dL   GLUCOSE, POC    Collection Time: 02/28/17  6:53 AM   Result Value Ref Range    Glucose (POC) 195 (H) 65 - 100 mg/dL         Problem List:  )  Patient Active Problem List   Diagnosis Code    Gangrene of toe ([facility] I96    Diabetes mellitus ([facility] E11.9    HTN (hypertension) I10    ARF (acute renal failure) ([facility]) N17.9    KIM (acute kidney injury) ([facility] N17.9    ESRD (end stage renal disease) on dialysis ([facility] N18.6, Z99.2    Nephrotic syndrome N04.9    Edema R60.9    Osteomyelitis of toe- LEFT 2nd toe M86.9    Localized osteoarthrosis not specified whether primary or secondary, lower leg M17.9    S/P total knee replacement Z96.659    Morbid obesity with BMI of 40.0-44.9, adult ([facility]) E66.01, Z68.41    GERD (gastroesophageal reflux disease) K21.9    Hypothyroidism E03.9    S/P total knee arthroplasty Z96.659    Acute respiratory failure ([facility]) J96.00    Volume overload E87.70    HCAP (healthcare-associated pneumonia) J18.9    Acute URI J06.9    Chest congestion R09.89    Sinus pressure J34.89    Diabetic toe ulcer ([facility]) E11.621, L97.509    Venous stasis ulcer ([facility]) I83.009, L97.909    Rash and other nonspecific skin eruption R21    Infected prosthetic knee joint ([facility]) T84.59XA, Z96.659    Anemia of chronic disease D63.8       Total Joint Surgery Pre-Assessment Recommendations:           Recommend continuous saturation monitoring hours of sleep, during hospitalization.         Signed By: ANDREW Nieto    February 28, 2017

## 2017-02-28 NOTE — PROGRESS NOTES
Received to room from PACU. Drowsy with periods of apnea. Vomited a small amount of clear fluid. O2 sat 89% on 4L. Increased O2. Notified MD. Patient opens eyes to voice but does not respond to questions. . MD in to see patient. New orders received.

## 2017-02-28 NOTE — PROGRESS NOTES
Hibiclens bath performed by patient care techs. Nurse placed nguyen cathetar because patient had hard time getting to bathroom and is for surgery tomorrow. Medicated for pain.

## 2017-02-28 NOTE — BRIEF OP NOTE
BRIEF OPERATIVE NOTE    Date of Procedure: 2/28/2017   Preoperative Diagnosis: Presence of artificial knee joint, right [Z96.651]  Postoperative Diagnosis: Presence of artificial knee joint, right [Z96.651]    Procedure(s):  Right KNEE ARTHROPLASTY TOTAL REVISION WITH ISERTION OF ANTIBIOTIC KNEE SPACER AND BEADS  Surgeon(s) and Role:     * Cassie Flores MD - Primary       Physician Assistant: Malinda Boxer, PA    Surgical Staff:  Circ-1: Alonzo Holter, RN  Circ-Relief: Jane Wright RN  Physician Assistant: Malinda Boxer, PA  Scrub Tech-1: Hurshel Smack  Scrub Tech-2: Jose Simons Labrum  Scrub Tech-Relief: Radha Jono Senduros  Scrub Tech-3: Bailee Cornel  Event Time In   Incision Start 1415   Incision Close      Anesthesia: General   Estimated Blood Loss: 100 cc  Specimens:   ID Type Source Tests Collected by Time Destination   1 : opening right knee Wound Knee  CULTURE, ANAEROBIC Cassie Flores MD 2/28/2017 1431 Microbiology   2 : opening right knee Wound Knee  CULTURE, WOUND W GRAM STAIN Cassie Flores MD 2/28/2017 1432 Microbiology      Findings: infection   Complications: none  Implants:   Implant Name Type Inv.  Item Serial No.  Lot No. LRB No. Used Action   CEMENT BNE GENTAMC HV R+G 40GM -- PALACOS - [id number]  CEMENT BNE GENTAMC HV R+G 40GM -- PALACOS [id number] [employer] [id number] Right 1 Implanted   GRAFT BNE PASTE RAPID CUR 10ML -- STIMULAN - [id number]  GRAFT BNE PASTE RAPID CUR 10ML -- STIMULAN [id number]/[id number]/[id number] [employer] 12/16-[id number]/[id number] Right 1 Implanted   CEMENT BNE GENTAMC HV R+G 40GM -- PALACOS - [id number]  CEMENT BNE GENTAMC HV R+G 40GM -- PALACOS [id number] [employer] [id number] Right 1 Implanted   COMPNT FEM POST STBL 6 R --  - SUGAZA  COMPNT FEM POST STBL 6 R --  UGAZA [employer] HOW UGAZA Right 1 Implanted   16MM ALL POLY TRIATHLON PS     [id number] [facility] [id number] Right 1 Implanted

## 2017-02-28 NOTE — H&P
The patient has infected R-TKA. The patient was see and examined in the office prior to today, there are no changes to the patient's conditions. They have tried conservative treatment for this condition; including lifestyle modifications and antiinflammatories and have failed. The necessity for the joint replacement is still present, and the H&P is still current. The patient will be admitted today for removal of implants and placement new knee. Gianni Aponte

## 2017-02-28 NOTE — PERIOP NOTES
Betadine lavage: 17.5cc of betadine lot # [id number], exp. Date 09/18,  in 500cc of . 9NS Lot # [id number], exp.  Date : 1mar2019

## 2017-02-28 NOTE — PERIOP NOTES
TRANSFER - OUT REPORT:    Verbal report given to Natalie Brooks RN on Rajesh Jordan  [facility] of care       Report consisted of patients Situation, Background, Assessment and   Recommendations(SBAR). Information from the following report(s) SBAR, MAR and Recent Results was reviewed with the receiving nurse. Lines:   Peripheral IV 02/28/17 Right; Inner Arm (Active)   Site Assessment Clean, dry, & intact 2/28/2017  9:00 AM   Phlebitis Assessment 0 2/28/2017  9:00 AM   Infiltration Assessment 0 2/28/2017  9:00 AM   Dressing Status Clean, dry, & intact 2/28/2017  9:00 AM   Dressing Type Tape;Transparent 2/28/2017  9:00 AM   Hub Color/Line Status Green; Infusing 2/28/2017  9:00 AM        Opportunity for questions and clarification was provided. Patient transported with:   Tee Poon to call when Vanco needs to be started.

## 2017-02-28 NOTE — ANESTHESIA PREPROCEDURE EVALUATION
Anesthetic History               Review of Systems / Medical History  Patient summary reviewed and pertinent labs reviewed    Pulmonary                   Neuro/Psych              Cardiovascular    Hypertension: well controlled  Valvular problems/murmurs (,Aortic stenosis, moderate 7/8/14 ): aortic stenosis            Exercise tolerance: >4 METS     GI/Hepatic/Renal     GERD: well controlled    Renal disease (M-W-F): ESRD and dialysis       Endo/Other    Diabetes: well controlled, type 2  Hypothyroidism: well controlled  Morbid obesity, arthritis and anemia     Other Findings   Comments: Infected knee         Physical Exam    Airway             Cardiovascular  Regular rate and rhythm,  S1 and S2 normal,  no murmur, click, rub, or gallop             Dental         Pulmonary  Breath sounds clear to auscultation               Abdominal  GI exam deferred       Other Findings            Anesthetic Plan    ASA: 4, emergent  Anesthesia type: general      Post-op pain plan if not by surgeon: peripheral nerve block single    Induction: Intravenous  Anesthetic plan and risks discussed with: Patient

## 2017-02-28 NOTE — PERIOP NOTES
TRANSFER - IN REPORT:    Verbal report received from Ludwin Julian RN on Angy Valenzuela  being received from [facility] for routine progression of care      Report consisted of patients Situation, Background, Assessment and   Recommendations(SBAR). Information from the following report(s) SBAR, Kardex, Intake/Output, MAR and Recent Results was reviewed with the receiving nurse. Opportunity for questions and clarification was provided. Assessment completed upon patients arrival to unit and care assumed.

## 2017-02-28 NOTE — PROGRESS NOTES
Received patient awake, alert and oriented. Patient denies pain at this time. Patient instructed that she will be NPO past midnight for surgery. Nursing assessment completed. Bed in low and locked position. Patient instructed to call for assistance, if needed.

## 2017-03-01 PROBLEM — R65.21 SEPTIC SHOCK (HCC): Status: ACTIVE | Noted: 2017-01-01

## 2017-03-01 PROBLEM — A41.9 SEPTIC SHOCK (HCC): Status: ACTIVE | Noted: 2017-01-01

## 2017-03-01 PROBLEM — I48.92 ATRIAL FLUTTER (HCC): Status: ACTIVE | Noted: 2017-01-01

## 2017-03-01 PROBLEM — R00.0 TACHYCARDIA: Status: RESOLVED | Noted: 2017-01-01 | Resolved: 2017-01-01

## 2017-03-01 PROBLEM — Z66 DNR (DO NOT RESUSCITATE): Status: ACTIVE | Noted: 2017-01-01

## 2017-03-01 NOTE — PROGRESS NOTES
Dr Melissa Curry notified of critical troponin level and changes in vital signs. New orders received.

## 2017-03-01 NOTE — PROGRESS NOTES
Responded to code blue. Pt was transferred to ems stretcher and placed on ems vent. Proceeded to sadia down leading to arrest.  cpr initiated. Pt given one epi and 2 bicarb and successfully resuscitated. Pt monitored on ems vent and vs.  Decision to proceed with transport to [address] [place] [address] at this time.

## 2017-03-01 NOTE — PROGRESS NOTES
Hospitalist Progress Note    3/1/2017  Admit Date: 2017  4:32 PM   NAME: Faye Callahan   :  1945   MRN:  [id number]   Attending: Gianni Graham MD  PCP:  Annemarie Lee MD    SUBJECTIVE:     Faye Callahan is a 65yoF with ESRD admitted on  after R TKA revision and abx spacer placement  septic joint. Hospitalist was initially consulted for lethargy post-op. There is concern that she was oversedated during surgery, some response to narcan. Needs HD today. She has also been febrile since surgery and lactic acid has trended up to 6.3. Abx expanded to vanc/zosyn. Has had some hypotension, running gentle IVF with some improvement. This AM, she is lethargic. Will open eyes to name but will not answer questions. Spoke with  at bedside. Questions answered and he understands why she needs to be transferred downtown. Review of Systems negative with exception of pertinent positives noted above      PHYSICAL EXAM       Visit Vitals    BP 92/45    Pulse 81    Temp 99.8 °F (37.7 °C)    Resp 22    Ht 5' 6\" (1.676 m)    Wt 115.7 kg (255 lb 1.2 oz)    SpO2 95%    Breastfeeding No    BMI 41.17 kg/m2      Temp (24hrs), Av.6 °F (37.6 °C), Min:98.3 °F (36.8 °C), Max:100.6 °F (38.1 °C)    Oxygen Therapy  O2 Sat (%): 95 % (17)  Pulse via Oximetry: 98 beats per minute (17)  O2 Device: Hi flow nasal cannula (17)  O2 Flow Rate (L/min): 9 l/min (17)    Intake/Output Summary (Last 24 hours) at 17 0913  Last data filed at 17 1535   Gross per 24 hour   Intake              425 ml   Output               50 ml   Net              375 ml          General: NAD, lethargic but awakens to name  Head:  Atraumatic Normocephalic. Eyes:  Anicteric. ENT:  MMM  Lungs:  Difficult exam, clear anteriorly  CVS:  RRR, tachycardic  Abdomen: Soft, obese, ND/NTTP, +BS  MSK:  Spontaneously moves extremities.   Neurologic:  Not answering orientation questions, not following commands  Psychiatry:      Unable to assess    Recent Results (from the past 24 hour(s))   CULTURE, ANAEROBIC    Collection Time: 02/28/17  2:20 PM   Result Value Ref Range    Special Requests: RIGHT  OPENING        Culture result:        SUBCULTURE IS NECESSARY TO DETERMINE PRESENCE OR ABSENCE OF ANAEROBIC BACTERIA IN THIS CULTURE. FURTHER REPORT TO FOLLOW AFTER INCUBATION OF SUBCULTURE.    CULTURE, WOUND Asa Sandifer STAIN    Collection Time: 02/28/17  2:20 PM   Result Value Ref Range    Special Requests: RIGHT  OPENING        GRAM STAIN PENDING     Culture result: MODERATE  STAPHYLOCOCCUS SPECIES   (A)      Culture result: SUBCULTURE IN PROGRESS     EKG, 12 LEAD, INITIAL    Collection Time: 02/28/17  7:10 PM   Result Value Ref Range    Systolic BP  mmHg    Diastolic BP  mmHg    Ventricular Rate 116 BPM    Atrial Rate 232 BPM    P-R Interval  ms    QRS Duration 114 ms    Q-T Interval 370 ms    QTC Calculation (Bezet) 514 ms    Calculated P Axis 67 degrees    Calculated R Axis -138 degrees    Calculated T Axis -161 degrees    Diagnosis       Atypical  Atrial flutter with variable A-V block  Indeterminate axis  Incomplete right bundle branch block  Nonspecific ST abnormality  Abnormal ECG  When compared with ECG of 24-AUG-2014 02:42,  Atrial flutter has replaced Sinus rhythm  Incomplete right bundle branch block is now Present  Confirmed by Karol Ward MD (), NUSRAT MUNSON ([id number]) on 3/1/2017 6:50:48 AM     GLUCOSE, POC    Collection Time: 02/28/17  7:38 PM   Result Value Ref Range    Glucose (POC) 166 (H) 65 - 100 mg/dL   TROPONIN I    Collection Time: 02/28/17  7:41 PM   Result Value Ref Range    Troponin-I, Qt. 0.06 (H) 0.02 - 0.05 NG/ML   HEMOGLOBIN    Collection Time: 02/28/17  8:16 PM   Result Value Ref Range    HGB 9.2 (L) 11.7 - 15.4 g/dL   BLOOD GAS, ARTERIAL    Collection Time: 02/28/17  9:06 PM   Result Value Ref Range    pH 7.39 7.35 - 7.45      PCO2 42 35 - 45 mmHg    PO2 68 (L) 75.0 - 100.0 mmHg    BICARBONATE 25 22 - 26 mmol/L    BASE EXCESS 0.1 0 - 3 mmol/L    SITE RR     ALLENS TEST POSITIVE      MODE NC     O2 FLOW 4.00 L/min   GLUCOSE, POC    Collection Time: 02/28/17 10:23 PM   Result Value Ref Range    Glucose (POC) 133 (H) 65 - 100 mg/dL   TROPONIN I    Collection Time: 03/01/17  1:15 AM   Result Value Ref Range    Troponin-I, Qt. 0.09 (H) 0.02 - 0.05 NG/ML   TROPONIN I    Collection Time: 03/01/17  3:50 AM   Result Value Ref Range    Troponin-I, Qt. 0.10 (HH) 0.02 - 0.05 NG/ML   LACTIC ACID, PLASMA    Collection Time: 03/01/17  4:20 AM   Result Value Ref Range    Lactic acid 3.7 (HH) 0.4 - 2.0 MMOL/L   GLUCOSE, POC    Collection Time: 03/01/17  4:26 AM   Result Value Ref Range    Glucose (POC) 121 (H) 65 - 100 mg/dL   HEMOGLOBIN    Collection Time: 03/01/17  4:40 AM   Result Value Ref Range    HGB 9.2 (L) 11.7 - 21.5 g/dL   METABOLIC PANEL, BASIC    Collection Time: 03/01/17  4:40 AM   Result Value Ref Range    Sodium 138 136 - 145 mmol/L    Potassium 4.6 3.5 - 5.1 mmol/L    Chloride 99 98 - 107 mmol/L    CO2 23 21 - 32 mmol/L    Anion gap 16 7 - 16 mmol/L    Glucose 120 (H) 65 - 100 mg/dL    BUN 47 (H) 8 - 23 MG/DL    Creatinine 7.74 (H) 0.6 - 1.0 MG/DL    GFR est AA 7 (L) >60 ml/min/1.73m2    GFR est non-AA 5 (L) >60 ml/min/1.73m2    Calcium 9.4 8.3 - 10.4 MG/DL   CBC WITH AUTOMATED DIFF    Collection Time: 03/01/17  6:35 AM   Result Value Ref Range    WBC 13.1 (H) 4.3 - 11.1 K/uL    RBC 2.72 (L) 4.05 - 5.25 M/uL    HGB 8.9 (L) 11.7 - 15.4 g/dL    HCT 28.7 (L) 35.8 - 46.3 %    .5 (H) 79.6 - 97.8 FL    MCH 32.7 26.1 - 32.9 PG    MCHC 31.0 (L) 31.4 - 35.0 g/dL    RDW 14.9 (H) 11.9 - 14.6 %    PLATELET 749 (L) 142 - 450 K/uL    MPV 12.6 10.8 - 14.1 FL    DF AUTOMATED      NEUTROPHILS 78 43 - 78 %    LYMPHOCYTES 13 13 - 44 %    MONOCYTES 9 4.0 - 12.0 %    EOSINOPHILS 0 (L) 0.5 - 7.8 %    BASOPHILS 0 0.0 - 2.0 %    IMMATURE GRANULOCYTES 0.5 0.0 - 5.0 %    ABS.  NEUTROPHILS 10.2 (H) 1.7 - 8.2 K/UL    ABS. LYMPHOCYTES 1.7 0.5 - 4.6 K/UL    ABS. MONOCYTES 1.1 0.1 - 1.3 K/UL    ABS. EOSINOPHILS 0.0 0.0 - 0.8 K/UL    ABS. BASOPHILS 0.0 0.0 - 0.2 K/UL    ABS. IMM. GRANS. 0.1 0.0 - 0.5 K/UL   LACTIC ACID, PLASMA    Collection Time: 03/01/17  8:17 AM   Result Value Ref Range    Lactic acid 6.3 (HH) 0.4 - 2.0 MMOL/L         Imaging /Procedures /Studies   XR CHEST PORT   Final Result      MIld pulm edema, no obvious infiltrate- per my read      [address] Problems as of 3/1/2017  Date Reviewed: 12/15/2016          Codes Class Noted - Resolved POA    Atrial flutter ([facility] ICD-10-CM: I48.92  ICD-9-CM: 427.32  3/1/2017 - Present Clinically Undetermined        Acute encephalopathy ICD-10-CM: G93.40  ICD-9-CM: 348.30  2/28/2017 - Present No    Overview Signed 2/28/2017  8:14 PM by Angleo Carrillo. Duncan Infante MD     Post-surgical; possibly due to sedation             Type 2 diabetes 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, with long-term current use of insulin ([facility]) ICD-10-CM: E11.22, N18.6, Z99.2, Z79.4  ICD-9-CM: 250.40, 585.9, V45.11, V58.67  2/28/2017 - Present Yes        * (Principal)Infected prosthetic knee joint ([facility] ICD-10-CM: T84.59XA, Z96.659  ICD-9-CM: 996.66, V43.65  2/27/2017 - Present Unknown        ESRD (end stage renal disease) on dialysis [facility]) ICD-10-CM: N18.6, Z99.2  ICD-9-CM: 585.6, V45.11  8/24/2014 - Present Yes    Overview Signed 7/12/2016  5:26 PM by Jessica Field MD     Pt has HD 3 days per week. Follows regularly with Nephrology; needs to lose 23 lbs to be considered for renal transplant.              S/P total knee replacement ICD-10-CM: W90.958  ICD-9-CM: V43.65  8/24/2014 - Present Yes    Overview Signed 8/25/2014  8:56 AM by Army Hodgkin, NP     7/18/14             S/P total knee arthroplasty ICD-10-CM: N63.037  ICD-9-CM: V43.65  7/21/2014 - Present Yes        RESOLVED: Tachycardia ICD-10-CM: R00.0  ICD-9-CM: 785.0  2/28/2017 - 3/1/2017 Unknown        RESOLVED: Sedated ICD-10-CM: R40.4  ICD-9-CM: 780.09  2/28/2017 - 2/28/2017 No                  Plan:  - Severe sepsis: vanc/zosyn. Blood cx pending. Previous wound cx with light MSSA. Further intra-op wound cx pending. Continue gentle IVF. Trend lactic acid. Ortho consulted ID on admission.    - AMS: likely multifactorial 2/2 sepsis and oversedation from surgery. Some improvement after narcan. Continue to monitor closely. Holding pain meds    - ESRD: nephrology consulted, plan for HD today    - DM2: decrease home lantus from 70u to 35u QHS. SSI ACHS    - Tachycardia: continue dilt and coreg if BP can tolerate.  Needs remote tele on transfer    DVT Prophylaxis: patient is HIGH RISK for DVT given acute and chronic medical issues  Dispo: transfer downtown    Drew Petersen MD

## 2017-03-01 NOTE — PROGRESS NOTES
Care Management Interventions  Mode of Transport at Discharge: BLS  Transition of Care Consult (CM Consult): SNF  Discharge Durable Medical Equipment: No  Physical Therapy Consult: Yes  Occupational Therapy Consult: Yes  Current Support Network: Lives with Spouse  Confirm Follow Up Transport: Family  Plan discussed with Pt/Family/Caregiver: Yes  Freedom of Choice Offered: Yes  Discharge Location  Discharge Placement: Skilled nursing facility    Patient is a 70 yr old admitted for a revision of right TKA. She lives with her spouse in [place]. Dtr is visiting from [place]. Patient is a hemodialysis patient that goes to [facility] normally by car but stretcher transport has been arranged starting this week. Dtr in room states spouse cannot care for her this weak. They would like her to go to [facility] 9th floor rehab so that she can dialysis in the hosp. Dr. Loretha Dance and [facility] consulted. Patient moving to [address] [place] [address] Road location. I have notified them of her transfer.   Daina Sears

## 2017-03-01 NOTE — CONSULTS
Infectious Disease Consult    Today's Date: 3/1/2017   Admit Date: 2/27/2017    Impression:   · MSSA right TKA infection  · S/p I&D, revision with spacer placement, 2/28/17 - op cx with Staph  · Post-operative shock with lactic acidosis - septic versus cardiogenic  · Ischemic changes to legs and hands  · SVT s/p cardiac arrest  · Hypoxic respiratory failure s/p intubation  · ESRD on HD  · Fever  · Peripheral vascular disease s/p amputation of toes on both feet    Plan:   · Unclear etiology of shock - possibly cardiogenic given cardiac arrest and SVT. However, I agree with aggressive treatment of sepsis. I doubt that the MSSA knee infection is driving the decline because she received more than adequate treatment with her surgery. · Continue vancomycin and zosyn for now and follow up on cultures. · Agree with TTE to evaluate shock. · Check urine culture. Anti-infectives:     Inpat Anti-Infectives     Start     Ordered Stop    03/01/17 0600  piperacillin-tazobactam (ZOSYN) 3.375 g in 0.9% sodium chloride (MBP/ADV) 100 mL  3.375 g,   IntraVENous,   EVERY 12 HOURS      03/01/17 0504 --    02/28/17 2046  vancomycin (VANCOCIN) 1750 mg in  ml infusion  1,750 mg,   IntraVENous,   SEE ADMIN INSTRUCTIONS      02/28/17 2058 --          Subjective:   Date of Consultation:  March 1, 2017  Referring Physician: Dr. Lali Ayoub    Patient is a 70 y.o. female with ESRD on HD who presented to [facility] for right knee infection. Pre-operatively she had right knee aspirate which grew MSSA. She had symptoms for one week prior to presentation. On 2/28 she was admitted for R TKA revision with spacer placement. She was lethargic post-operatively and ultimately required a rapid response due to tachycardia. Her lethargy temporarily improved with narcan. She was transferred to the ICU with shock and ultimately had cardiac arrest x 2. She is now intubated and minimally responsive.   History is obtained from her family and the medical record. She was given vancomycin and cefazolin with surgery, and is currently receiving vancomycin and zosyn due to sepsis. She has continued to make some urine despite ESRD and currently has a Roth catheter. ID is consulted for further recommendations.     Patient Active Problem List   Diagnosis Code    [facility] toe ([facility] I96    Diabetes mellitus ([facility] E11.9    HTN (hypertension) I10    ARF (acute renal failure) ([facility]) N17.9    KIM (acute kidney injury) ([facility] N17.9    ESRD (end stage renal disease) on dialysis ([facility] N18.6, Z99.2    Nephrotic syndrome N04.9    Edema R60.9    Osteomyelitis of toe- LEFT 2nd toe M86.9    Localized osteoarthrosis not specified whether primary or secondary, lower leg M17.9    S/P total knee replacement Z96.659    Morbid obesity with BMI of 40.0-44.9, adult ([facility]) E66.01, Z68.41    GERD (gastroesophageal reflux disease) K21.9    Hypothyroidism E03.9    S/P total knee arthroplasty Z96.659    Acute respiratory failure ([facility]) J96.00    Volume overload E87.70    HCAP (healthcare-associated pneumonia) J18.9    Acute URI J06.9    Chest congestion R09.89    Sinus pressure J34.89    Diabetic toe ulcer ([facility]) E11.621, L97.509    Venous stasis ulcer ([facility]) I83.009, L97.909    Rash and other nonspecific skin eruption R21    Infected prosthetic knee joint ([facility]) T84.59XA, Z96.659    Anemia of chronic disease D63.8    Acute encephalopathy G93.40    Type 2 diabetes 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, with long-term current use of insulin ([facility]) E11.22, N18.6, Z99.2, Z79.4    Atrial flutter ([facility]) I48.92     Past Medical History:   Diagnosis Date    Aortic stenosis 7/8/14    mild to moderate    Arthritis     osteoarthritis - generalized     Atrial flutter ([facility]) 3/1/2017    Chronic kidney disease     Stage 4    Chronic pain     right knee    Diabetes ([facility] dx 1985    Type 2 -140  Hypoglycemia sx @ 79    GERD (gastroesophageal reflux disease) controlled with meds     GERD (gastroesophageal reflux disease) 7/18/2014    Hypertension      controlled with medication    Morbid obesity ([facility] 6/30/14    BMI- 40.9    MRSA infection     multiple times in toes    Psychiatric disorder     PVD (peripheral vascular disease) ([facility]     Thyroid disease     hypothyroid      Family History   Problem Relation Age of Onset    Diabetes Mother     Hypertension Mother     Diabetes Father     Hypertension Father     Heart Attack Sister     Heart Attack Brother     Heart Attack Paternal Aunt     Heart Attack Paternal Uncle     Malignant Hyperthermia Neg Hx     Pseudocholinesterase Deficiency Neg Hx     Delayed Awakening Neg Hx     Post-op Nausea/Vomiting Neg Hx     Emergence Delirium Neg Hx     Post-op Cognitive Dysfunction Neg Hx     Other Neg Hx       Social History   Substance Use Topics    Smoking status: Former Smoker     Packs/day: 1.50     Years: 20.00    Smokeless tobacco: Never Used      Comment: quit 21 years ago    Alcohol use No      Comment: quit 20 years ago      Past Surgical History:   Procedure Laterality Date    HX CATARACT REMOVAL Bilateral     HX HEENT Left     laser surgery     HX OPEN CHOLECYSTECTOMY  1970's    HX ORTHOPAEDIC Left 2002    foot fx repair with hardware     HX ORTHOPAEDIC Left 2006    great toe amputation  , left second toe amputation    HX ORTHOPAEDIC Left 2011     ankle tendon release     HX RETINAL DETACHMENT REPAIR Right 2/2014    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 Authorizing Provider   silver sulfADIAZINE (SILVADENE) 1 % topical cream Apply  to affected area daily.  12/15/16   Sariah Rosado MD   carvedilol (COREG) 12.5 mg tablet Take 2 Tabs by mouth two (2) times daily (with meals). Indications: Hypertension 12/15/16   Leopoldo Ar, MD   cloNIDine HCl (CATAPRES) 0.1 mg tablet Take 1 Tab by mouth two (2) times a day. 12/15/16   Leopoldo Ar, MD   dilTIAZem XR (DILACOR XR) 180 mg XR capsule Take 1 Cap by mouth daily. 12/15/16   Leopoldo Ar, MD   folic acid (FOLVITE) 1 mg tablet Take 1 Tab by mouth daily. 12/15/16   Leopoldo Ar, MD   FLUoxetine (PROZAC) 20 mg capsule Take 1 Cap by mouth daily. Indications: DEPRESSION ASSOCIATED WITH BIPOLAR DISORDER 12/15/16   Leopoldo Ar, MD   irbesartan (AVAPRO) 300 mg tablet Take 1 Tab by mouth nightly. 12/15/16   Leopoldo Ar, MD   levothyroxine (SYNTHROID) 50 mcg tablet Take 1 Tab by mouth Daily (before breakfast). Indications: hypothyroidism 12/15/16   Leopoldo Ar, MD   omeprazole (PRILOSEC) 20 mg capsule Take 1 Cap by mouth daily. Indications: GASTRIC HYPERSECRETION WITH SYSTEMIC MASTOCYTOSIS, am 12/15/16   Leopoldo Ar, MD   rOPINIRole (REQUIP) 0.5 mg tablet Take 1 Tab by mouth nightly. Indications: Restless Legs Syndrome 12/15/16   Leopoldo Ar, MD   LORazepam (ATIVAN) 0.5 mg tablet Take 1 Tab by mouth three (3) times daily. Max Daily Amount: 1.5 mg. 12/15/16   Leopoldo Ar, MD   sevelamer carbonate (RENVELA) 800 mg tab tab Take 1 Tab by mouth two (2) times a day. Indications: RENAL OSTEODYSTROPHY WITH HYPERPHOSPHATEMIA 12/15/16   Leopoldo Ar, MD   furosemide (LASIX) 80 mg tablet Take 1 Tab by mouth two (2) times a day. Indications: Edema 12/15/16   Leopoldo Ar, MD   pregabalin (LYRICA) 100 mg capsule Take 1 Cap by mouth three (3) times daily. Max Daily Amount: 300 mg. 12/15/16   Leopoldo Ar, MD   traMADol [facility]) 50 mg tablet Take 1 Tab by mouth two (2) times a day. Max Daily Amount: 100 mg. 12/15/16   Leopoldo Ar, MD   HYDROcodone-acetaminophen [facility]) 7.5-325 mg per tablet Take 1 Tab by mouth two (2) times a day. Max Daily Amount: 2 Tabs.  12/15/16   Leopoldo Ar, MD HYDROcodone-acetaminophen (NORCO) 7.5-325 mg per tablet Take 1 Tab by mouth two (2) times a day. Max Daily Amount: 2 Tabs. 2/15/17   Ben Lackey MD   HYDROcodone-acetaminophen [facility]) 7.5-325 mg per tablet Take 1 Tab by mouth two (2) times a day. Max Daily Amount: 2 Tabs. 1/15/17   Ben Lackey MD   trimethoprim-sulfamethoxazole (BACTRIM DS, SEPTRA DS) 160-800 mg per tablet Take 1 Tab by mouth two (2) times a day. 11/18/16   Ben Lackey MD   meloxicam (MOBIC) 7.5 mg tablet Take 1 Tab by mouth daily. 10/6/16   Ben Lackey MD   HYDROcodone-homatropine (HYCODAN) 5-1.5 mg/5 mL (5 mL) syrup Take 5 mL by mouth four (4) times daily. Max Daily Amount: 20 mL. 10/6/16   Ben Lackey MD   ciprofloxacin HCl (CIPRO) 500 mg tablet Take 1 Tab by mouth two (2) times a day. 10/6/16   Ben Lackey MD   doxycycline (MONODOX) 100 mg capsule Take 1 Cap by mouth two (2) times a day. 6/1/15   Cathryn Lambert NP   ginkgo biloba 120 mg tab Take  by mouth. Historical Provider   doxazosin (CARDURA) 4 mg tablet 4 mg daily. 5/11/15   Historical Provider   trimethoprim (TRIMPEX) 100 mg tablet Take 1 Tab by mouth daily. 5/27/15   Cathryn Lambert NP   docusate sodium (STOOL SOFTENER) 100 mg capsule Take 1 capsule by mouth daily. 8/27/14   Mil Robbins NP   acetaminophen (TYLENOL EXTRA STRENGTH) 500 mg tablet Take 1.5 tablets by mouth every six (6) hours as needed. Take if needed DOS per anesthesia protocol. 8/27/14   Mil Robbins NP   cholecalciferol, VITAMIN D3, (VITAMIN D3) 5,000 unit tab tablet Take 1 tablet by mouth daily (after breakfast). 8/27/14   Mil Robbins NP   diphenhydrAMINE (BENADRYL) 25 mg capsule Take 1 capsule by mouth as needed. Take if needed DOS per anesthesia protocol. 8/27/14   Mil Robbins NP   vit B Cmplx 3-FA-Vit C-Biotin (VOL-CARE RX) 1- mg-mg-mcg tablet Take 1 tablet by mouth daily.     Rain Olivares MD   insulin glargine (LANTUS) 100 unit/mL injection 70 Units by SubCUTAneous route nightly. Indications: DIABETES MELLITUS 14   Savage Santacruz MD       Allergies   Allergen Reactions    Iodinated Contrast Media - Oral And Iv Dye Hives and Itching        Review of Systems:  Review of systems not obtained due to patient factors. Objective:     Visit Vitals    /54    Pulse 93    Temp (!) 100.6 °F (38.1 °C)    Resp 29    Ht 5' 6\" (1.676 m)    Wt 115.7 kg (255 lb 1.2 oz)    SpO2 94%    Breastfeeding No    BMI 41.17 kg/m2     Temp (24hrs), Av.8 °F (37.7 °C), Min:98.3 °F (36.8 °C), Max:100.6 °F (38.1 °C)       Lines:  Peripheral IV:            Physical Exam:    General:  Intubated, sedated but moving head around;   Eyes:  Sclera anicteric. Pupils equally round and reactive to light. Mouth/Throat: Intubated    Neck: Supple   Lungs:   Coarse breath sounds bilaterally without wheezing;   CV:  Regular rate and rhythm,no murmur, click, rub or gallop   Abdomen:   Soft, non-tender. bowel sounds normal. non-distended   Extremities: No cyanosis or edema   Skin: Skin color, texture, turgor normal. no acute rash or lesions       Musculoskeletal: S/p multiple toe amputations; R TKA incision bandaged;   Lines/Devices:  Intact, no erythema, drainage or tenderness   Psych: Intubated and sedated       Data Review:     CBC:  Recent Labs      17   0635  17   WBC  13.1*   --    --   5.5   GRANS  78   --    --   78   MONOS  9   --    --   10   EOS  0*   --    --   1   ANEU  10.2*   --    --   4.3   ABL  1.7   --    --   0.6   HGB  8.9*  9.2*  9.2*  9.5*   HCT  28.7*   --    --   29.7*   PLT  146*   --    --   128*       BMP:  Recent Labs      17   CREA  7.74*  5.08*   BUN  47*  27*   NA  138  135*   K  4.6  3.4*   CL  99  95*   CO2  23  29   AGAP  16  11   GLU  120*  111*       LFTS:  No results for input(s): TBILI, ALT, SGOT, AP, TP, ALB in the last 72 hours.     Microbiology:     All Micro Results     Procedure Component Value Units Date/Time    CULTURE, Jocelin Meneses STAIN [[id number]]  (Abnormal) Collected:  02/28/17 1420    Order Status:  Completed Specimen:  Knee  Updated:  03/01/17 1225     Special Requests: --        RIGHT  OPENING       GRAM STAIN FEW  GRAM POSITIVE COCCI         2 TO 12 WBC'S PER OIF     Culture result: MODERATE  STAPHYLOCOCCUS SPECIES   (A)       SUBCULTURE IN PROGRESS       CULTURE, ANAEROBIC [[id number]] Collected:  02/28/17 1420    Order Status:  Completed Specimen:  Knee  Updated:  03/01/17 0943     Special Requests: --        RIGHT  OPENING       Culture result:         SUBCULTURE IS NECESSARY TO DETERMINE PRESENCE OR ABSENCE OF ANAEROBIC BACTERIA IN THIS CULTURE. FURTHER REPORT TO FOLLOW AFTER INCUBATION OF SUBCULTURE. CULTURE, BLOOD [[id number]] Collected:  03/01/17 0635    Order Status:  Completed Specimen:  Blood from Blood Updated:  03/01/17 0654    CULTURE, BLOOD [[id number]] Collected:  03/01/17 0546    Order Status:  Completed Specimen:  Blood from Blood Updated:  03/01/17 0617    MSSA/MRSA SC BY PCR, NASAL SWAB [[id number]]  (Abnormal) Collected:  02/27/17 1717    Order Status:  Completed Specimen:  Swab Updated:  02/27/17 1848     Special Requests: NO SPECIAL REQUESTS        Culture result:         MRSA target DNA not detected, SA target DNA detected. A MRSA negative, SA positive test result does not preclude MRSA nasal colonization. (A)          Imaging:   3/1/17 CXR: Findings: Interval intubation, the endotracheal tube tip terminates  approximately 6 cm above the violet. A new left subclavian approach central  venous line appears to terminate in the superior vena cava. Persistent mild  apparent cardiomegaly. Persistent slightly low lung volumes especially on the  right. Mild diffuse interstitial prominence unchanged and nonspecific. No  evidence of pneumothorax, pleural effusion. Visualized soft tissue and osseous  structures otherwise unremarkable.      2/28/17 CXR: Impressions: Coarsening of the interstitial lung markings. Cannot exclude mild  underlying pulmonary edema. Otherwise, no change when compared with the prior  study.     Signed By: Tula Sever, MD     March 1, 2017

## 2017-03-01 NOTE — PROGRESS NOTES
Called to bedside for rapid response. Was tachycardic to 170s, IV lopressor was given. Patient's HR dropped and pulse was lost. Code was called. Received atropine, epi x 2, and bicarb x 2. Pulse regained. Brown fluid was suctioned from both esophagus and trachea. Cardiac arrest likely 2/2 septic shock, possibly from aspiration. Anesthesia was able to intubate patient at bedside, then she lost pulses again. Regained after 2 rounds of epi. Levophed gtt was started and she was transferred to the ICU. Anesthesia placed central line and pressors are running through it now. She is on Levo 30, vaso ordered and available. Have spoken with her family and they would like to continue FULL CODE at this time. Once stabilized, will transfer to ICU downtown for further management as will likely need CRRT. Hospitalist will take over as primary upon transfer downtown.     I personally spent 35min of direct patient care in a critical care situation today    Camden Weeks MD  03/01/17  2:54 PM

## 2017-03-01 NOTE — PROGRESS NOTES
Patient received from [facility]. Bed placed in room, placed on monitor. Hypotensive and Levophed titrating. Dr Hazel Wolfe in to place central line, NG placed to suction with return of 200 ml dark, green drainage. Numerous areas of breakdown noted to skin folds. Amputation to left first three toes noted and duskiness noted to R second and third toes. Wound noted to R second toe. SCD and MYA in place, cooling mechanism noted to R leg and dressing to R knee intact. No breakdown noted to sacrum. Patient given Hibiclens bath. RT at bedside for ventilator managment.

## 2017-03-01 NOTE — PROGRESS NOTES
Intubated by Dr Jaqueline Goodwin. Suction used. Large amount of green/brown fluid returned. No pulse noted at this time. @nd amp of epinephrine given. Compressions started.

## 2017-03-01 NOTE — PROGRESS NOTES
TRANSFER - OUT REPORT:    Verbal report given to STANLEY Salmon RN on Tashi Parker  being transferred to [address] [place] [address] for routine progression of care       Report consisted of patients Situation, Background, Assessment and   Recommendations(SBAR). Information from the following report(s) SBAR was reviewed with the receiving nurse. Lines:   Triple Lumen Left subclavian TLC 03/01/17 Left Subclavian (Active)       Peripheral IV 03/01/17 Right Hand (Active)   Site Assessment Clean, dry, & intact 3/1/2017  1:00 PM   Phlebitis Assessment 0 3/1/2017  1:00 PM   Infiltration Assessment 0 3/1/2017  1:00 PM   Dressing Status Clean, dry, & intact 3/1/2017  1:00 PM   Dressing Type Transparent;Tape 3/1/2017  1:00 PM   Hub Color/Line Status Pink;Patent; Flushed; Infusing 3/1/2017  1:00 PM        Opportunity for questions and clarification was provided. Patient transported with:   ACLS transport with [facility] [place] sent to dinner and will meet in the ICU waiting room.

## 2017-03-01 NOTE — PROGRESS NOTES
March 1, 2017         Post Op day: 1 Day Post-Op     Admit Date: 2/27/2017  Admit Diagnosis: Presence of artificial knee joint, right [Z96.651]        Subjective: No complaints from , she is arousable but somnolent.      Objective:   Vital Signs are Stable, No Acute Distress, Alert and Oriented, Dressing is Dry,  Neurovascular exam is normal.     Assessment / Plan :  Patient Active Problem List   Diagnosis Code    [facility] I96    Diabetes mellitus ([facility] E11.9    HTN (hypertension) I10    ARF (acute renal failure) ([facility]) N17.9    KIM (acute kidney injury) ([facility] N17.9    ESRD (end stage renal disease) on dialysis ([facility] N18.6, Z99.2    Nephrotic syndrome N04.9    Edema R60.9    Osteomyelitis of toe- LEFT 2nd toe M86.9    Localized osteoarthrosis not specified whether primary or secondary, lower leg M17.9    S/P total knee replacement Z96.659    Morbid obesity with BMI of 40.0-44.9, adult ([facility]) E66.01, Z68.41    GERD (gastroesophageal reflux disease) K21.9    Hypothyroidism E03.9    S/P total knee arthroplasty Z96.659    Acute respiratory failure ([facility]) J96.00    Volume overload E87.70    HCAP (healthcare-associated pneumonia) J18.9    Acute URI J06.9    Chest congestion R09.89    Sinus pressure J34.89    Diabetic toe ulcer ([facility]) E11.621, L97.509    Venous stasis ulcer ([facility]) I83.009, L97.909    Rash and other nonspecific skin eruption R21    Infected prosthetic knee joint ([facility]) T84.59XA, Z96.659    Anemia of chronic disease D63.8    Acute encephalopathy G93.40    Type 2 diabetes 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, with long-term current use of insulin ([facility]) E11.22, N18.6, Z99.2, Z79.4    Atrial flutter ([facility]) I48.92    Patient Vitals for the past 8 hrs:   BP Temp Pulse Resp SpO2   03/01/17 0755 92/45 99.8 °F (37.7 °C) 81 22 98 %   03/01/17 0622 (!) 93/35 99.9 °F (37.7 °C) 89 22 97 %   03/01/17 0447 (!) 84/42 99.9 °F (37.7 °C) 98 24 98 %    Temp (24hrs), Av.6 °F (37.6 °C), Min:98.3 °F (36.8 °C), Max:100.6 °F (38.1 °C)    Body mass index is 41.17 kg/(m^2). Continue PT  Lab Results   Component Value Date/Time    HGB 8.9 2017 06:35 AM    Monitor HGB   Pt seen by and discussed with Supervising Physician   Likely downtown today for dialysis.      Signed By: DOMENIC Lino

## 2017-03-01 NOTE — PROGRESS NOTES
New telephone orders read back and verified. Give Lantus 40 units subQ x1 tonigh and recheck FSBS q6 hours until patient is alert and eating and drinking.

## 2017-03-01 NOTE — ED TRIAGE NOTES
Epi @ 5845, 2nd epi @ 7323, Bicarb 1800, 2nd bicarb 1802. Patient being shocked, patient in asystole @ 1803. compressions resumed. Patient continues to be asystole @ 1804.  Femoral pulse obtained @ 1804 BP @ 200/79

## 2017-03-01 NOTE — PROGRESS NOTES
Spiritual Care Assessment/Progress Notes    Comments: Spiritual care assessment completed. Responded to Code Blue and prayer and support given to daughter and family. Continued support given.     Juan Daniel Austin [id number]  [id number]    1945  70 y.o.  female    Patient Telephone Number: [phone] (home)   Moravian Affiliation: [facility]   Language: Reeda Heading   Extended Emergency Contact Information  Primary Emergency Contact: Marquis Palacios  Address: [address] Phone: [phone]  Mobile Phone: [phone]  Relation: Spouse  Secondary Emergency Contact: [address] [place] [address] Avenue Phone: [phone]  Relation: Daughter   Patient Active Problem List    Diagnosis Date Noted    Atrial flutter ([address] 03/01/2017    Anemia of chronic disease 02/28/2017    Acute encephalopathy 02/28/2017    Type 2 diabetes 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, with long-term current use of insulin ([address] 02/28/2017    Infected prosthetic knee joint ([address] 02/27/2017    Rash and other nonspecific skin eruption 12/15/2016    Diabetic toe ulcer ([address] 11/18/2016    Venous stasis ulcer ([address] 11/18/2016    Acute URI 10/06/2016    Chest congestion 10/06/2016    Sinus pressure 10/06/2016    Diabetes mellitus ([address] 08/24/2014    HTN (hypertension) 08/24/2014    ESRD (end stage renal disease) on dialysis ([address] 08/24/2014    S/P total knee replacement 08/24/2014    Morbid obesity with BMI of 40.0-44.9, adult ([address] 08/24/2014    Acute respiratory failure ([address] 08/24/2014    Volume overload 08/24/2014    HCAP (healthcare-associated pneumonia) 08/24/2014    S/P total knee arthroplasty 07/21/2014    Localized osteoarthrosis not specified whether primary or secondary, lower leg 07/18/2014    GERD (gastroesophageal reflux disease) 07/18/2014    Hypothyroidism 07/18/2014    Osteomyelitis of toe- LEFT 2nd toe 01/31/2013    KIM (acute kidney injury) ([facility] 01/29/2013    Nephrotic syndrome 01/29/2013    Edema 01/29/2013    ARF (acute renal failure) ([facility] 07/23/2010    Gangrene of toe ([facility] 07/21/2010        Date: 3/1/2017       Level of Zoroastrianism/Spiritual Activity:  []         Involved in sofi tradition/spiritual practice    []         Not involved in sofi tradition/spiritual practice  [x]         Spiritually oriented    []         Claims no spiritual orientation    []         seeking spiritual identity  []         Feels alienated from Restoration practice/tradition  []         Feels angry about Restoration practice/tradition  [x]         Spirituality/Restoration tradition is a resource for coping at this time.   []         Not able to assess due to medical condition    Services Provided Today:  []         crisis intervention    []         reading Scriptures  [x]         spiritual assessment    [x]         prayer  []         empathic listening/emotional support  []         rites and rituals (cite in comments)  []         life review     []         Restoration support  []         theological development   []         advocacy  []         ethical dialog     []         blessing  []         bereavement support    [x]         support to family  []         anticipatory grief support   []         help with AMD  []         spiritual guidance    []         meditation      Spiritual Care Needs  []         Emotional Support  []         Spiritual/Zoroastrianism Care  []         Loss/Adjustment  []         Advocacy/Referral                /Ethics  []         No needs expressed at               this time  []         Other: (note in               comments)  [address]  []         Follow up visits with               pt/family  []         Provide materials  []         Schedule sacraments  []         Contact Community               Clergy  []         Follow up as needed  []         Other: (note in               comments)

## 2017-03-01 NOTE — PROGRESS NOTES
02/28/17 2201   Oxygen Therapy   O2 Sat (%) 93 %   Pulse via Oximetry 104 beats per minute   O2 Device Hi flow nasal cannula   O2 Flow Rate (L/min) 6 l/min    Continuous sat monitor ordered QHS. Previous data deleted. Pt on HFNC 6lpm. Family at bedside.

## 2017-03-01 NOTE — PROGRESS NOTES
Patient out from operating room and placed on the ventilator on documented settings. Patient is orally intubated with a # 7.0 ET Tube secured at the 24 cm herbert at the lip. Breath sounds are coarse. Trachea is midline. Negative for subcutaneous air, chest excursion is symmetric. Negitive for pitting edema. Patient is also Negative for cyanosis. .  All alarms are set and audible. Resuscitation bag is at the head of the bed.       Ventilator Settings  Mode FIO2 Rate Tidal Volume Pressure PEEP I:E Ratio    PRVC  50 %  14    600    8 cm H20         Peak airway pressure:     Minute ventilation: 12.9 l/min     ABG:   Recent Labs      03/01/17   1355  02/28/17   2106   PH  7.27*  7.39   PCO2  37  42   PO2  139*  68*   HCO3  16*  25     FiO2  decreased to 40%    Kanu Finch, RT

## 2017-03-01 NOTE — PROGRESS NOTES
Lars Jasmine RN states pt is set to transfer downtown for HD. Her sats decreased to 77% per Domonique this morning. Hold PT/OT today.      Katherin Acevedo, SPT

## 2017-03-01 NOTE — PROGRESS NOTES
Lopressor 5 mg given IV slowly per MD order of  on monitor. Patient responding to voice. HR decreasing to 135-140.

## 2017-03-01 NOTE — CONSULTS
[address] Nephrology Consultation    Admission Date:  2/27/2017    Admission Diagnosis:  Presence of artificial knee joint, right [Z96.651]    We are asked to manage ESRD    History of Present Illness:  Pt is a 71 yo WF h/o ESRD at [facility] on TTS schedule but dialyzed Monday in anticipation of knee surgery. She was s/p removal right knee arthroplasty this morning with revision for infected right knee after c/p pain and difficulty ambulating. Post-op, she developed SVT and subsequently cardiac arrest.  She is on pressor support now and in ICU at [facility] with plans to transfer downtown.     Past Medical History:   Diagnosis Date    Aortic stenosis 7/8/14    mild to moderate    Arthritis     osteoarthritis - generalized     Atrial flutter ([address] 3/1/2017    Chronic kidney disease     Stage 4    Chronic pain     right knee    Diabetes ([address] dx 1985    Type 2 -140  Hypoglycemia sx @ 79    GERD (gastroesophageal reflux disease)     controlled with meds     GERD (gastroesophageal reflux disease) 7/18/2014    Hypertension      controlled with medication    Morbid obesity ([address] 6/30/14    BMI- 40.9    MRSA infection     multiple times in toes    Psychiatric disorder     PVD (peripheral vascular disease) ([address]     Thyroid disease     hypothyroid      Past Surgical History:   Procedure Laterality Date    HX CATARACT REMOVAL Bilateral     HX HEENT Left     laser surgery     HX OPEN CHOLECYSTECTOMY  1970's    HX ORTHOPAEDIC Left 2002    foot fx repair with hardware     HX ORTHOPAEDIC Left 2006    great toe amputation  , left second toe amputation    HX ORTHOPAEDIC Left 2011     ankle tendon release     HX RETINAL DETACHMENT REPAIR Right 2/2014    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    HX RETINAL DETACHMENT REPAIR Right 2012    HX UROLOGICAL Right 1992    nephrectomy     HX VASCULAR ACCESS  1/2014    A-V shunt left upper arm    VASCULAR SURGERY PROCEDURE UNLIST      L femoral stent Current Facility-Administered Medications   Medication Dose Route Frequency    carvedilol (COREG) tablet 6.25 mg  6.25 mg Oral BID WITH MEALS    piperacillin-tazobactam (ZOSYN) 3.375 g in 0.9% sodium chloride (MBP/ADV) 100 mL  3.375 g IntraVENous Q12H    insulin glargine (LANTUS) injection 35 Units  35 Units SubCUTAneous QHS    LORazepam (ATIVAN) tablet 0.5 mg  0.5 mg Oral TID PRN    heparin (porcine) injection 5,000 Units  5,000 Units SubCUTAneous Q8H    NOREPINephrine (LEVOPHED) 4,000 mcg in dextrose 5% 250 mL infusion  2-16 mcg/min IntraVENous TITRATE    dilTIAZem CD (CARDIZEM CD) capsule 180 mg  180 mg Oral DAILY    doxazosin (CARDURA) tablet 4 mg  4 mg Oral QHS    FLUoxetine (PROzac) capsule 20 mg  20 mg Oral DAILY    furosemide (LASIX) tablet 80 mg  80 mg Oral BID    levothyroxine (SYNTHROID) tablet 50 mcg  50 mcg Oral ACB    pantoprazole (PROTONIX) tablet 40 mg  40 mg Oral ACB    pregabalin (LYRICA) capsule 100 mg  100 mg Oral TID    rOPINIRole (REQUIP) tablet 0.5 mg  0.5 mg Oral QHS    sodium chloride (NS) flush 5-10 mL  5-10 mL IntraVENous Q8H    sodium chloride (NS) flush 5-10 mL  5-10 mL IntraVENous PRN    acetaminophen (TYLENOL) tablet 1,000 mg  1,000 mg Oral Q6H    naloxone (NARCAN) injection 0.2-0.4 mg  0.2-0.4 mg IntraVENous Q10MIN PRN    dexamethasone (DECADRON) injection 10 mg  10 mg IntraVENous ONCE    ondansetron (ZOFRAN) injection 4 mg  4 mg IntraVENous Q4H PRN    diphenhydrAMINE (BENADRYL) capsule 25 mg  25 mg Oral Q4H PRN    senna-docusate (PERICOLACE) 8.6-50 mg per tablet 2 Tab  2 Tab Oral DAILY    zolpidem (AMBIEN) tablet 5 mg  5 mg Oral QHS PRN    irbesartan (AVAPRO) tablet 300 mg  300 mg Oral DAILY    sevelamer carbonate (RENVELA) tab 800 mg  800 mg Oral TID WITH MEALS    metoprolol (LOPRESSOR) injection 5 mg  5 mg IntraVENous Q6H PRN    vancomycin (VANCOCIN) 1750 mg in  ml infusion  1,750 mg IntraVENous See Admin Instructions     Allergies   Allergen Reactions    Iodinated Contrast Media - Oral And Iv Dye Hives and Itching      Social History   Substance Use Topics    Smoking status: Former Smoker     Packs/day: 1.50     Years: 20.00    Smokeless tobacco: Never Used      Comment: quit 21 years ago    Alcohol use No      Comment: quit 21 years ago       Family History   Problem Relation Age of Onset    Diabetes Mother     Hypertension Mother     Diabetes Father     Hypertension Father     Heart Attack Sister     Heart Attack Brother     Heart Attack Paternal Aunt     Heart Attack Paternal Uncle     Malignant Hyperthermia Neg Hx     Pseudocholinesterase Deficiency Neg Hx     Delayed Awakening Neg Hx     Post-op Nausea/Vomiting Neg Hx     Emergence Delirium Neg Hx     Post-op Cognitive Dysfunction Neg Hx     Other Neg Hx         Review of Systems:   On vent    Objective:  Vitals:    03/01/17 0622 03/01/17 0755 03/01/17 0859 03/01/17 1114   BP: (!) 93/35 92/45  108/53   Pulse: 89 81  (!) 107   Resp: 22 22  20   Temp: 99.9 °F (37.7 °C) 99.8 °F (37.7 °C)  (!) 100.6 °F (38.1 °C)   SpO2: 97% 98% 95% 94%   Weight:       Height:           Intake/Output Summary (Last 24 hours) at 03/01/17 1342  Last data filed at 02/28/17 1535   Gross per 24 hour   Intake              425 ml   Output               50 ml   Net              375 ml     Wt Readings from Last 3 Encounters:   02/28/17 115.7 kg (255 lb 1.2 oz)   02/25/17 105.2 kg (232 lb)   12/15/16 115.7 kg (255 lb)       GEN - intubated  Neck - no JVD  CV - regular, no murmur, no rub  Lung - clear bilaterally, lungs expand symmetrically  Chest wall - normal appearance  Abd - soft, nontender, bowel sounds present  Ext - no edema, AVF in JODY + bruit  Genitourinary - bladder nonpalpable  Skin - no rashes, no purpura      Data Review:     Recent Labs      03/01/17   0635  03/01/17   0440  02/28/17 2016 02/27/17   1928   WBC  13.1*   --    --   5.5   HGB  8.9*  9.2*  9.2*  9.5*   HCT  28.7*   --    --   29.7* PLT  146*   --    --   128*   INR   --    --    --   1.2        Recent Labs      03/01/17   0440  02/27/17   1928   NA  138  135*   K  4.6  3.4*   CL  99  95*   CO2  23  29   BUN  47*  27*   CREA  7.74*  5.08*   CA  9.4  8.7   GLU  120*  111*         Assessment:     Principal Problem:    Infected prosthetic knee joint (HCC) (2/27/2017)    Active Problems:    ESRD (end stage renal disease) on dialysis ([facility] (8/24/2014)      Overview: Pt has HD 3 days per week. Follows regularly with Nephrology; needs to       lose 23 lbs to be considered for renal transplant. S/P total knee replacement (8/24/2014)      Overview: 7/18/14      S/P total knee arthroplasty (7/21/2014)      Acute encephalopathy (2/28/2017)      Overview: Post-surgical; possibly due to sedation      Type 2 diabetes 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, with long-term current use of insulin ([facility] (2/28/2017)      Atrial flutter ([facility] (3/1/2017)        Plan:     1. ESRD - now in shock after cardiac arrest  - Last dialysis Monday 2/27  - [place] when more stable  - Will reassess once at [facility] to determine dialysis needs, likely SLED or CRRT  2. S/p right knee surgery  3. S/p cardiac arrest  4.  Shock

## 2017-03-01 NOTE — DISCHARGE SUMMARY
[address] [place] [address]  Total Joint Discharge Summary      Patient ID:  Harshad Virk  [id number]  61 y.o.  1945    Admit date: 2/27/2017  Discharge date and time: 03/01/17  Admitting Physician: Jonathan Hernandez MD  Surgeon: Same  Admission Diagnoses: Presence of artificial knee joint, right [Z96.651]  Discharge Diagnoses: Principal Problem:    Infected prosthetic knee joint ([facility] (2/27/2017)    Active Problems:    ESRD (end stage renal disease) on dialysis ([facility] (8/24/2014)      Overview: Pt has HD 3 days per week. Follows regularly with Nephrology; needs to       lose 23 lbs to be considered for renal transplant. S/P total knee replacement (8/24/2014)      Overview: 7/18/14      S/P total knee arthroplasty (7/21/2014)      Acute encephalopathy (2/28/2017)      Overview: Post-surgical; possibly due to sedation      Type 2 diabetes 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, with long-term current use of insulin ([facility] (2/28/2017)      Atrial flutter ([facility] (3/1/2017)                                Perioperative Antibiotics: Ancef 1 to 2 mg was given depending on patients Wieght.  If allergic to Ancef or due to other indications, patient was given Vancomycin      Hospital Medications given:   Current Facility-Administered Medications   Medication Dose Route Frequency    carvedilol (COREG) tablet 6.25 mg  6.25 mg Oral BID WITH MEALS    piperacillin-tazobactam (ZOSYN) 3.375 g in 0.9% sodium chloride (MBP/ADV) 100 mL  3.375 g IntraVENous Q12H    dilTIAZem CD (CARDIZEM CD) capsule 180 mg  180 mg Oral DAILY    doxazosin (CARDURA) tablet 4 mg  4 mg Oral QHS    FLUoxetine (PROzac) capsule 20 mg  20 mg Oral DAILY    furosemide (LASIX) tablet 80 mg  80 mg Oral BID    insulin glargine (LANTUS) injection 70 Units  70 Units SubCUTAneous QHS    levothyroxine (SYNTHROID) tablet 50 mcg  50 mcg Oral ACB    LORazepam (ATIVAN) tablet 0.5 mg  0.5 mg Oral TID    pantoprazole (PROTONIX) tablet 40 mg  40 mg Oral ACB    pregabalin (LYRICA) capsule 100 mg  100 mg Oral TID    rOPINIRole (REQUIP) tablet 0.5 mg  0.5 mg Oral QHS    sodium chloride (NS) flush 5-10 mL  5-10 mL IntraVENous Q8H    sodium chloride (NS) flush 5-10 mL  5-10 mL IntraVENous PRN    acetaminophen (TYLENOL) tablet 1,000 mg  1,000 mg Oral Q6H    naloxone (NARCAN) injection 0.2-0.4 mg  0.2-0.4 mg IntraVENous Q10MIN PRN    dexamethasone (DECADRON) injection 10 mg  10 mg IntraVENous ONCE    ondansetron (ZOFRAN) injection 4 mg  4 mg IntraVENous Q4H PRN    diphenhydrAMINE (BENADRYL) capsule 25 mg  25 mg Oral Q4H PRN    senna-docusate (PERICOLACE) 8.6-50 mg per tablet 2 Tab  2 Tab Oral DAILY    zolpidem (AMBIEN) tablet 5 mg  5 mg Oral QHS PRN    aspirin delayed-release tablet 325 mg  325 mg Oral Q12H    oxyCODONE IR (ROXICODONE) tablet 10 mg  10 mg Oral Q3H PRN    irbesartan (AVAPRO) tablet 300 mg  300 mg Oral DAILY    sevelamer carbonate (RENVELA) tab 800 mg  800 mg Oral TID WITH MEALS    metoprolol (LOPRESSOR) injection 5 mg  5 mg IntraVENous Q6H PRN    vancomycin (VANCOCIN) 1750 mg in  ml infusion  1,750 mg IntraVENous See Admin Instructions           Additional DVT Prophylaxis:  MYA Hose,Plexi-Pulse    Postoperative transfusions: none  Post Op complications: none    Hemoglobin at discharge:   Lab Results   Component Value Date/Time    HGB 8.9 03/01/2017 06:35 AM       Wound appears to be healing without any evidence of infection. Physical Therapy started on the day of surgery and progressed. PT/OT:          Assistive Device: Walker (comment)                Discharged to: [facility] for dialysis.     Discharge instructions:  -Rx pain medication given  - Anticoagulate with: Ecotrin 325mg PO BID x 5 weeks unless patient is on Coumadin, plavix, or similar medication   -Resume pre hospital diet             -Resume home medications per medical continuation form     -Ambulate with walker, appropriate total joint protocol  -Follow up in office as scheduled       Signed:  Pedro Area, PA  3/1/2017  8:50 AM

## 2017-03-01 NOTE — PROGRESS NOTES
Patient placed on stretcher with EMS personnel and RT. Patient grimacing to move. VSS. Patient placed on EMS ventilator and immediately was tachypnic, cyanotic and HR began to decrease. Pulse remaind present. Agnoal respirations were then noted and bradycardia on hospital monitor. CPR initiated and Code Blue called. Dr Wood Lucas and Dr Lorena Gifford at bedside. Epinephrine and Bicarb pushed, see MAR. During compression exchange, HR was noted to be 123, strong pulse present, . Patient continued to be bagged. EMS personnel placed patient back on their vent and Dr Wood Lucas observed patient's respirations. Patient remains unresponsive, VSS.

## 2017-03-01 NOTE — CONSULTS
HOSPITALIST H&P/CONSULT  NAME:  Makayla Aguilar   Age:  70 y.o.  :   1945   MRN:   [id number]  PCP: Arsalan Mills MD  Consulting MD:  Treatment Team: Attending Provider: Kitty Bui MD; Consulting Provider: Triston Joaquin MD; Care Manager: CESAR Perez; Utilization Review: Marylu Hobson; Consulting Provider: Yajaira Nieto MD; Consulting Provider: Sugar Duncan. Nicki De La Cruz MD; Consulting Provider: Aliya Castellon MD  HPI:   Mrs. Alisson Livingston is a 69 yo F admitted 17 for revision of R knee arthroplasty. She is post-op day 0 and had total revision R knee arthroplasty with insertion of antibiotic spacer and beads. Initially consulted for diabetes management, but requested to see her urgently by nursing staff after arrival to floor from PACU. Her , Oracio Acevedo, and daughter are at bedside. They state after surgery, she has not really woken up. She has had 2 vomiting episodes, one in the PACU and one when she returned to the floor. She is very groggy and only opens her eyes for a few seconds, but does not answer questions. She has a pmhx of ESRD and is on hemodialysis T// usually. She received dialysis yesterday.     Complete ROS done and is as stated in HPI or otherwise negative  Past Medical History:   Diagnosis Date    Aortic stenosis 14    mild to moderate    Arthritis     osteoarthritis - generalized     Atrial flutter ([address] 3/1/2017    Chronic kidney disease     Stage 4    Chronic pain     right knee    Diabetes ([address] dx 1985    Type 2 -140  Hypoglycemia sx @ 79    GERD (gastroesophageal reflux disease)     controlled with meds     GERD (gastroesophageal reflux disease) 2014    Hypertension      controlled with medication    Morbid obesity ([address] 14    BMI- 40.9    MRSA infection     multiple times in toes    Psychiatric disorder     PVD (peripheral vascular disease) ([address]     Thyroid disease     hypothyroid      Past Surgical History: Procedure Laterality Date    HX CATARACT REMOVAL Bilateral     HX HEENT Left     laser surgery     HX OPEN CHOLECYSTECTOMY  1970's    HX ORTHOPAEDIC Left 2002    foot fx repair with hardware     HX ORTHOPAEDIC Left 2006    great toe amputation  , left second toe amputation    HX ORTHOPAEDIC Left 2011     ankle tendon release     HX RETINAL DETACHMENT REPAIR Right 2/2014    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 Taking? FLUoxetine (PROZAC) 20 mg capsule   No No   Sig: Take 1 Cap by mouth daily. Indications: DEPRESSION ASSOCIATED WITH BIPOLAR DISORDER   HYDROcodone-acetaminophen (NORCO) 7.5-325 mg per tablet   No No   Sig: Take 1 Tab by mouth two (2) times a day. Max Daily Amount: 2 Tabs. HYDROcodone-acetaminophen (NORCO) 7.5-325 mg per tablet   No No   Sig: Take 1 Tab by mouth two (2) times a day. Max Daily Amount: 2 Tabs. HYDROcodone-acetaminophen (NORCO) 7.5-325 mg per tablet   No No   Sig: Take 1 Tab by mouth two (2) times a day. Max Daily Amount: 2 Tabs. HYDROcodone-homatropine (HYCODAN) 5-1.5 mg/5 mL (5 mL) syrup   No No   Sig: Take 5 mL by mouth four (4) times daily. Max Daily Amount: 20 mL. LORazepam (ATIVAN) 0.5 mg tablet   No No   Sig: Take 1 Tab by mouth three (3) times daily. Max Daily Amount: 1.5 mg.   acetaminophen (TYLENOL EXTRA STRENGTH) 500 mg tablet   No No   Sig: Take 1.5 tablets by mouth every six (6) hours as needed. Take if needed DOS per anesthesia protocol. aspirin (ASPIRIN) 325 mg tablet   Yes No   Sig: Take 325 mg by mouth daily. carvedilol (COREG) 12.5 mg tablet   No No   Sig: Take 2 Tabs by mouth two (2) times daily (with meals).  Indications: Hypertension   cholecalciferol, VITAMIN D3, (VITAMIN D3) 5,000 unit tab tablet   No No   Sig: Take 1 tablet by mouth daily (after breakfast). ciprofloxacin HCl (CIPRO) 500 mg tablet   No No   Sig: Take 1 Tab by mouth two (2) times a day. cloNIDine HCl (CATAPRES) 0.1 mg tablet   No No   Sig: Take 1 Tab by mouth two (2) times a day. dilTIAZem XR (DILACOR XR) 180 mg XR capsule   No No   Sig: Take 1 Cap by mouth daily. diphenhydrAMINE (BENADRYL) 25 mg capsule   No No   Sig: Take 1 capsule by mouth as needed. Take if needed DOS per anesthesia protocol. docusate sodium (STOOL SOFTENER) 100 mg capsule   No No   Sig: Take 1 capsule by mouth daily. doxazosin (CARDURA) 4 mg tablet   Yes No   Si mg daily. doxycycline (MONODOX) 100 mg capsule   No No   Sig: Take 1 Cap by mouth two (2) times a day. folic acid (FOLVITE) 1 mg tablet   No No   Sig: Take 1 Tab by mouth daily. furosemide (LASIX) 80 mg tablet   No No   Sig: Take 1 Tab by mouth two (2) times a day. Indications: Edema   ginkgo biloba 120 mg tab   Yes No   Sig: Take  by mouth. insulin glargine (LANTUS) 100 unit/mL injection   No No   Si Units by SubCUTAneous route nightly. Indications: DIABETES MELLITUS   irbesartan (AVAPRO) 300 mg tablet   No No   Sig: Take 1 Tab by mouth nightly. levothyroxine (SYNTHROID) 50 mcg tablet   No No   Sig: Take 1 Tab by mouth Daily (before breakfast). Indications: hypothyroidism   meloxicam (MOBIC) 7.5 mg tablet   No No   Sig: Take 1 Tab by mouth daily. omeprazole (PRILOSEC) 20 mg capsule   No No   Sig: Take 1 Cap by mouth daily. Indications: GASTRIC HYPERSECRETION WITH SYSTEMIC MASTOCYTOSIS, am   pregabalin (LYRICA) 100 mg capsule   No No   Sig: Take 1 Cap by mouth three (3) times daily. Max Daily Amount: 300 mg. rOPINIRole (REQUIP) 0.5 mg tablet   No No   Sig: Take 1 Tab by mouth nightly. Indications: Restless Legs Syndrome   sevelamer carbonate (RENVELA) 800 mg tab tab   No No   Sig: Take 1 Tab by mouth two (2) times a day. Indications: RENAL OSTEODYSTROPHY WITH HYPERPHOSPHATEMIA   silver sulfADIAZINE (SILVADENE) 1 % topical cream   No No   Sig: Apply  to affected area daily. traMADol (ULTRAM) 50 mg tablet   No No   Sig: Take 1 Tab by mouth two (2) times a day. Max Daily Amount: 100 mg.   trimethoprim (TRIMPEX) 100 mg tablet   No No   Sig: Take 1 Tab by mouth daily. trimethoprim-sulfamethoxazole (BACTRIM DS, SEPTRA DS) 160-800 mg per tablet   No No   Sig: Take 1 Tab by mouth two (2) times a day. vit B Cmplx 3-FA-Vit C-Biotin (VOL-CARE RX) 1- mg-mg-mcg tablet   Yes No   Sig: Take 1 tablet by mouth daily.       Facility-Administered Medications: None     Allergies   Allergen Reactions    Iodinated Contrast Media - Oral And Iv Dye Hives and Itching      Social History   Substance Use Topics    Smoking status: Former Smoker     Packs/day: 1.50     Years: 20.00    Smokeless tobacco: Never Used      Comment: quit 21 years ago    Alcohol use No      Comment: quit 21 years ago       Family History   Problem Relation Age of Onset    Diabetes Mother     Hypertension Mother     Diabetes Father     Hypertension Father     Heart Attack Sister     Heart Attack Brother     Heart Attack Paternal Aunt     Heart Attack Paternal Uncle     Malignant Hyperthermia Neg Hx     Pseudocholinesterase Deficiency Neg Hx     Delayed Awakening Neg Hx     Post-op Nausea/Vomiting Neg Hx     Emergence Delirium Neg Hx     Post-op Cognitive Dysfunction Neg Hx     Other Neg Hx       Objective:     Visit Vitals    /52    Pulse (!) 115  Comment: notified RN    Temp 99.3 °F (37.4 °C)    Resp 24    Ht 5' 6\" (1.676 m)    Wt 115.7 kg (255 lb 1.2 oz)    SpO2 95%    Breastfeeding No    BMI 41.17 kg/m2      Temp (24hrs), Av.5 °F (37.5 °C), Min:98.3 °F (36.8 °C), Max:100.6 °F (38.1 °C)    Oxygen Therapy  O2 Sat (%): 95 % (17 2349)  Pulse via Oximetry: 104 beats per minute (17 2201)  O2 Device: Hi flow nasal cannula (02/28/17 2201)  O2 Flow Rate (L/min): 6 l/min (02/28/17 2201)  Physical Exam:  General:    Somnolent, obese, NAD     Head:   Normocephalic, without obvious abnormality, atraumatic. Nose:  Nares normal. No drainage or sinus tenderness. Lungs:   Slight bibasilar rhonchi, otherwise clear anteriorly  Heart:   Regular rhythm, slightly tachycardic  Abdomen:   Soft, non-tender. Not distended. Bowel sounds normal.   Extremities: No cyanosis. No edema. No clubbing. SCDs in place. Skin:     Texture, turgor normal. No rashes or lesions. Not Jaundiced  Neurologic: Groggy. Only opens eyes for a few seconds.   Data Review:   Recent Results (from the past 24 hour(s))   GLUCOSE, POC    Collection Time: 02/28/17  6:53 AM   Result Value Ref Range    Glucose (POC) 195 (H) 65 - 100 mg/dL   EKG, 12 LEAD, INITIAL    Collection Time: 02/28/17  7:10 PM   Result Value Ref Range    Systolic BP  mmHg    Diastolic BP  mmHg    Ventricular Rate 116 BPM    Atrial Rate 232 BPM    P-R Interval  ms    QRS Duration 114 ms    Q-T Interval 370 ms    QTC Calculation (Bezet) 514 ms    Calculated P Axis 67 degrees    Calculated R Axis -138 degrees    Calculated T Axis -161 degrees    Diagnosis       Atrial flutter with variable A-V block  Indeterminate axis  Incomplete right bundle branch block  Nonspecific ST abnormality  Abnormal ECG  When compared with ECG of 24-AUG-2014 02:42,  Atrial flutter has replaced Sinus rhythm  Incomplete right bundle branch block is now Present     GLUCOSE, POC    Collection Time: 02/28/17  7:38 PM   Result Value Ref Range    Glucose (POC) 166 (H) 65 - 100 mg/dL   TROPONIN I    Collection Time: 02/28/17  7:41 PM   Result Value Ref Range    Troponin-I, Qt. 0.06 (H) 0.02 - 0.05 NG/ML   HEMOGLOBIN    Collection Time: 02/28/17  8:16 PM   Result Value Ref Range    HGB 9.2 (L) 11.7 - 15.4 g/dL   BLOOD GAS, ARTERIAL    Collection Time: 02/28/17  9:06 PM   Result Value Ref Range    pH 7.39 7.35 - 7.45      PCO2 42 35 - 45 mmHg    PO2 68 (L) 75.0 - 100.0 mmHg    BICARBONATE 25 22 - 26 mmol/L    BASE EXCESS 0.1 0 - 3 mmol/L    SITE RR     ALLENS TEST POSITIVE      MODE NC     O2 FLOW 4.00 L/min   GLUCOSE, POC    Collection Time: 02/28/17 10:23 PM   Result Value Ref Range    Glucose (POC) 133 (H) 65 - 100 mg/dL   TROPONIN I    Collection Time: 03/01/17  1:15 AM   Result Value Ref Range    Troponin-I, Qt. 0.09 (H) 0.02 - 0.05 NG/ML     Imaging /Procedures /Studies   XR CHEST PORT   Final Result          Assessment and Plan: Active Hospital Problems    Diagnosis Date Noted    [facility] 03/01/2017    Acute encephalopathy 02/28/2017     Post-surgical; possibly due to sedation      Type 2 diabetes 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, with long-term current use of insulin ([facility] 02/28/2017    Infected prosthetic knee joint ([facility] 02/27/2017    S/P total knee replacement 08/24/2014 7/18/14      ESRD (end stage renal disease) on dialysis ([facility] 08/24/2014     Pt has HD 3 days per week. Follows regularly with Nephrology; needs to lose 23 lbs to be considered for renal transplant.  S/P total knee arthroplasty 07/21/2014       PLAN  ·  Encephalopathy- ? Due to surgery/sedation. Monitor. Check ABG. · Tachycardia- troponin pending. Will have cardiology review EKG. Appears sinus tach. Use prn metoprolol. · T2DM- blood sugars decently controlled. Continue current treatment for now. · ESRD- on HD. Code Status: Full    Anticipated discharge:     Signed By: Wilder Pena. Zander Gomes MD     March 1, 2017      Addendum:  I spoke with Dr. Tina Henriquez who confirmed EKG is atrial flutter. Given new onset atrial flutter, we discussed rate control, obtaining an echo, and cardiology consult for tomorrow.

## 2017-03-01 NOTE — PROGRESS NOTES
100 cc clear chevy urine emptied from nguyen prior to transfer [place]. Patient bathed by CNA and gown changed. Patient opens eyes to voice, but closed them quickly.

## 2017-03-01 NOTE — PROGRESS NOTES
TRANSFER - IN REPORT:    Verbal report received from [facility] on Christianne Tello  being received from [id number] for urgent transfer      Report consisted of patients Situation, Background, Assessment and   Recommendations(SBAR). Information from the following report(s) SBAR, Recent Results, Med Rec Status and Cardiac Rhythm NSR was reviewed with the receiving nurse. Opportunity for questions and clarification was provided. Assessment completed upon patients arrival to unit and care assumed.

## 2017-03-01 NOTE — OP NOTES
[address]   OPERATIVE REPORT       Name:  Demar Bay   MR#:  [id number]   :  1945   Account #:  [de-identified]   Date of Adm:  2017       DATE OF SURGERY: 2017    PREOPERATIVE DIAGNOSIS: Infection, right total knee   arthroplasty. POSTOPERATIVE DIAGNOSIS: Infection, right total knee   arthroplasty. PROCEDURE: Removal of right knee arthroplasty, implants and   revision total knee arthroplasty. ATTENDING SURGEON: Jose Eduardo Little MD    ASSISTANT: DOMENIC Rizvi     SPECIMENS: None. COMPLICATIONS: None. TOURNIQUET TIME: 63 minutes. INDICATIONS FOR PROCEDURE: Ms Sandie Urias is a lady on dialysis who   was noted to have a painful knee. Aspirate revealed an infection   in her total knee arthroplasty. She was appropriately managed   with her dialysis and brought to the operating room to address a   number of concern. She was a very minimal ambulator. Decision was made to perform a   1-stage exchange to increase her functional results. PROCEDURE: The patient seen in the operative room, adequate   induction with a general anesthetic, was prepped and draped in   sterile fashion over the right knee. The old incision was   utilized, carried through subcutaneous tissues and median   parapatellar incision to the fascia. Hemostasis obtained with   Bovie cauterization. It should be noted that the tourniquet was   inflated to accommodate blood loss control and was inflated for   a little over 1 hour. Dissection was then carried to superior pouch and the area was   debrided with an extensive synovectomy. Dissection was then   carried off the medial plateau and the knee rotated forward. During the initial exposure, dramatic purulence was appreciated. This was cultured. This area was then irrigated and washed   clear. Dissection was carried around the patella to mobilize it   in an everted position.  An oscillating saw was then used to   transect below the bone prosthetic interface. A bur was used to   remove the cement from the patella. Osteotome, mallet, and a reciprocating saw were used to expose   around the femur and the femur was tapped in the field. Osteotome and mallet were then used to expose around the tibia   and the tibial implant was removed from the field. All excess   cement was debrided from the area. It was soaked in Betadine   solution for approximately 5 minutes and then irrigated and   cleaned once again. Trial reduction revealed a 6 femur to be appropriate. The notch   was then cut to accommodate a size 6 posterior stabilized   component. This was trialed with a 16 mm polyethylene in a   Appropriate configuration noted to be stable. Two packs of Palacos cement were mixed at different times. The   first batch of Palacos G cement was mixed and used to pressurize   a 16 mm thick all polyethylene tibial component with appropriate   external rotation and the cement was allowed to cure. A second   batch of cement was started as the cement cured around the tibia   and used to cement the femoral component into place with excess   cement debrided from the region. The knee was placed through   range of motion, noted to be stable as mentioned above with   appropriate patellar tracking after a limited lateral release   was accomplished. Knee was stable in full extension and full flexion to 90   degrees. The patella tracked appropriately. Additional soaking and irrigation ensued. A #1 Vicryl, #1 PDS were used to approximate the fascial layers   with antibiotic, vancomycin beads impregnated in the superior   pouch and the joint line proper, 3-0 Monocryl subcutaneous   layer. Staples were used to close the skin. I placed a sterile   dressing. Transported to recovery at the time of dictation.         Liu Tucker MD      [facility] / [facility]   D:  02/28/2017   15:32   T:  03/01/2017   11:21   Job #:  [id number]

## 2017-03-01 NOTE — PROGRESS NOTES
O2 sat 77%. NC is out of nose. Placed NC back in nose and sat is 92%. Patient opens eyes to voice but quickly falls asleep.

## 2017-03-01 NOTE — ED PROVIDER NOTES
HPI Comments: This is a 72-year-old female who suffered a cardiac arrest.  She's had 3 of those earlier today. She was an inpatient at this hospital was being transferred to another hospital for dialysis. She is suffered from renal failure. She has had a post orthopedic wound infection. She was being loaded onto the ambulance when she went into asystole. CPR started and she was moved into the emergency department. No other history is obtainable at this point    Patient is a 70 y.o. female presenting with cardiac arrest. The history is provided by the EMS personnel. Cardiac arrest    This is a new problem. The current episode started less than 1 hour ago. The problem has not changed since onset.        Past Medical History:   Diagnosis Date    Aortic stenosis 7/8/14    mild to moderate    Arthritis     osteoarthritis - generalized     Atrial flutter ([address] 3/1/2017    Chronic kidney disease     Stage 4    Chronic pain     right knee    Diabetes ([address] dx 1985    Type 2 -140  Hypoglycemia sx @ 79    GERD (gastroesophageal reflux disease)     controlled with meds     GERD (gastroesophageal reflux disease) 7/18/2014    Hypertension      controlled with medication    Morbid obesity ([address] 6/30/14    BMI- 40.9    MRSA infection     multiple times in toes    Psychiatric disorder     PVD (peripheral vascular disease) ([address]     Thyroid disease     hypothyroid       Past Surgical History:   Procedure Laterality Date    HX CATARACT REMOVAL Bilateral     HX HEENT Left     laser surgery     HX OPEN CHOLECYSTECTOMY  1970's    HX ORTHOPAEDIC Left 2002    foot fx repair with hardware     HX ORTHOPAEDIC Left 2006    great toe amputation  , left second toe amputation    HX ORTHOPAEDIC Left 2011     ankle tendon release     HX RETINAL DETACHMENT REPAIR Right 2/2014    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    HX RETINAL DETACHMENT REPAIR Right 2012    HX UROLOGICAL Right 1992    nephrectomy     HX VASCULAR ACCESS  1/2014    A-V shunt left upper arm    VASCULAR SURGERY PROCEDURE UNLIST      L femoral stent          Family History:   Problem Relation Age of Onset    Diabetes Mother     Hypertension Mother     Diabetes Father     Hypertension Father     Heart Attack Sister     Heart Attack Brother     Heart Attack Paternal Aunt     Heart Attack Paternal Uncle     Malignant Hyperthermia Neg Hx     Pseudocholinesterase Deficiency Neg Hx     Delayed Awakening Neg Hx     Post-op Nausea/Vomiting Neg Hx     Emergence Delirium Neg Hx     Post-op Cognitive Dysfunction Neg Hx     Other Neg Hx        Social History     Social History    Marital status:      Spouse name: N/A    Number of children: N/A    Years of education: N/A     Occupational History    Not on file. Social History Main Topics    Smoking status: Former Smoker     Packs/day: 1.50     Years: 20.00    Smokeless tobacco: Never Used      Comment: quit 21 years ago    Alcohol use No      Comment: quit 20 years ago     Drug use: No    Sexual activity: Not on file     Other Topics Concern    Not on file     Social History Narrative    Lives with     Currently using w/c and walker for mobility        PCP: Dr. Winda Galeazzi    Cardiology: Dr. Muñiz Restorationism: Iodinated contrast media - oral and iv dye    Review of Systems   Unable to perform ROS: Unstable vital signs       Vitals:    03/01/17 1700 03/01/17 1730 03/01/17 1809 03/01/17 1812   BP: 119/52 160/68 169/70 169/70   Pulse: 88 (!) 122 (!) 114 (!) 109   Resp: 28 26 11 27   Temp:       SpO2: 95% 92%  (!) 89%            Physical Exam   Constitutional: She appears well-developed and well-nourished. She appears distressed. HENT:   Mouth/Throat: Oropharynx is clear and moist.   Eyes: Right pupil is not reactive. Left pupil is not reactive. Pupils are equal.   Cardiovascular:   No heart tones   Pulmonary/Chest: Effort normal and breath sounds normal. Apnea noted. Abdominal: Soft. She exhibits no distension. Skin: There is pallor. Cool skin   Nursing note and vitals reviewed. MDM  Number of Diagnoses or Management Options  Acute renal failure, unspecified acute renal failure type [facility]):   Cardiac arrest [facility]):   Diagnosis management comments: Possible hyperkalemic arrest.    Equal breath sounds oblique endotracheal tube that was already in place is functioning normally.        Amount and/or Complexity of Data Reviewed  Clinical lab tests: reviewed  Tests in the medicine section of CPT®: reviewed  Review and summarize past medical records: yes  Discuss the patient with other providers: yes  Independent visualization of images, tracings, or specimens: yes    Risk of Complications, Morbidity, and/or Mortality  Presenting problems: high  Diagnostic procedures: low  Management options: moderate    Critical Care  Total time providing critical care: 30-74 minutes    ED Course       Procedures    Results Include:    Recent Results (from the past 24 hour(s))   EKG, 12 LEAD, INITIAL    Collection Time: 02/28/17  7:10 PM   Result Value Ref Range    Systolic BP  mmHg    Diastolic BP  mmHg    Ventricular Rate 116 BPM    Atrial Rate 232 BPM    P-R Interval  ms    QRS Duration 114 ms    Q-T Interval 370 ms    QTC Calculation (Bezet) 514 ms    Calculated P Axis 67 degrees    Calculated R Axis -138 degrees    Calculated T Axis -161 degrees    Diagnosis       Atypical  Atrial flutter with variable A-V block  Indeterminate axis  Incomplete right bundle branch block  Nonspecific ST abnormality  Abnormal ECG  When compared with ECG of 24-AUG-2014 02:42,  Atrial flutter has replaced Sinus rhythm  Incomplete right bundle branch block is now Present  Confirmed by Brianne Dang MD (), NUSRAT MUNSON ([id number]) on 3/1/2017 6:50:48 AM     GLUCOSE, POC    Collection Time: 02/28/17  7:38 PM   Result Value Ref Range    Glucose (POC) 166 (H) 65 - 100 mg/dL   TROPONIN I    Collection Time: 02/28/17  7:41 PM Result Value Ref Range    Troponin-I, Qt. 0.06 (H) 0.02 - 0.05 NG/ML   HEMOGLOBIN    Collection Time: 02/28/17  8:16 PM   Result Value Ref Range    HGB 9.2 (L) 11.7 - 15.4 g/dL   BLOOD GAS, ARTERIAL    Collection Time: 02/28/17  9:06 PM   Result Value Ref Range    pH 7.39 7.35 - 7.45      PCO2 42 35 - 45 mmHg    PO2 68 (L) 75.0 - 100.0 mmHg    BICARBONATE 25 22 - 26 mmol/L    BASE EXCESS 0.1 0 - 3 mmol/L    SITE RR     ALLENS TEST POSITIVE      MODE NC     O2 FLOW 4.00 L/min   GLUCOSE, POC    Collection Time: 02/28/17 10:23 PM   Result Value Ref Range    Glucose (POC) 133 (H) 65 - 100 mg/dL   TROPONIN I    Collection Time: 03/01/17  1:15 AM   Result Value Ref Range    Troponin-I, Qt. 0.09 (H) 0.02 - 0.05 NG/ML   TROPONIN I    Collection Time: 03/01/17  3:50 AM   Result Value Ref Range    Troponin-I, Qt. 0.10 (HH) 0.02 - 0.05 NG/ML   LACTIC ACID, PLASMA    Collection Time: 03/01/17  4:20 AM   Result Value Ref Range    Lactic acid 3.7 (HH) 0.4 - 2.0 MMOL/L   GLUCOSE, POC    Collection Time: 03/01/17  4:26 AM   Result Value Ref Range    Glucose (POC) 121 (H) 65 - 100 mg/dL   HEMOGLOBIN    Collection Time: 03/01/17  4:40 AM   Result Value Ref Range    HGB 9.2 (L) 11.7 - 07.4 g/dL   METABOLIC PANEL, BASIC    Collection Time: 03/01/17  4:40 AM   Result Value Ref Range    Sodium 138 136 - 145 mmol/L    Potassium 4.6 3.5 - 5.1 mmol/L    Chloride 99 98 - 107 mmol/L    CO2 23 21 - 32 mmol/L    Anion gap 16 7 - 16 mmol/L    Glucose 120 (H) 65 - 100 mg/dL    BUN 47 (H) 8 - 23 MG/DL    Creatinine 7.74 (H) 0.6 - 1.0 MG/DL    GFR est AA 7 (L) >60 ml/min/1.73m2    GFR est non-AA 5 (L) >60 ml/min/1.73m2    Calcium 9.4 8.3 - 10.4 MG/DL   CBC WITH AUTOMATED DIFF    Collection Time: 03/01/17  6:35 AM   Result Value Ref Range    WBC 13.1 (H) 4.3 - 11.1 K/uL    RBC 2.72 (L) 4.05 - 5.25 M/uL    HGB 8.9 (L) 11.7 - 15.4 g/dL    HCT 28.7 (L) 35.8 - 46.3 %    .5 (H) 79.6 - 97.8 FL    MCH 32.7 26.1 - 32.9 PG    MCHC 31.0 (L) 31.4 - 35.0 g/dL    RDW 14.9 (H) 11.9 - 14.6 %    PLATELET 939 (L) 566 - 450 K/uL    MPV 12.6 10.8 - 14.1 FL    DF AUTOMATED      NEUTROPHILS 78 43 - 78 %    LYMPHOCYTES 13 13 - 44 %    MONOCYTES 9 4.0 - 12.0 %    EOSINOPHILS 0 (L) 0.5 - 7.8 %    BASOPHILS 0 0.0 - 2.0 %    IMMATURE GRANULOCYTES 0.5 0.0 - 5.0 %    ABS. NEUTROPHILS 10.2 (H) 1.7 - 8.2 K/UL    ABS. LYMPHOCYTES 1.7 0.5 - 4.6 K/UL    ABS. MONOCYTES 1.1 0.1 - 1.3 K/UL    ABS. EOSINOPHILS 0.0 0.0 - 0.8 K/UL    ABS. BASOPHILS 0.0 0.0 - 0.2 K/UL    ABS. IMM. GRANS. 0.1 0.0 - 0.5 K/UL   PROCALCITONIN    Collection Time: 03/01/17  6:35 AM   Result Value Ref Range    Procalcitonin 6.3 ng/mL   LACTIC ACID, PLASMA    Collection Time: 03/01/17  8:17 AM   Result Value Ref Range    Lactic acid 6.3 (HH) 0.4 - 2.0 MMOL/L   BLOOD GAS, ARTERIAL    Collection Time: 03/01/17  1:55 PM   Result Value Ref Range    pH 7.27 (L) 7.35 - 7.45      PCO2 37 35 - 45 mmHg    PO2 139 (H) 75.0 - 100.0 mmHg    BICARBONATE 16 (L) 22 - 26 mmol/L    BASE DEFICIT 9.7 (H) 0 - 2 mmol/L    TOTAL HEMOGLOBIN 7.2 (L) 11.7 - 15.0 GM/DL    O2 SAT 98 92.0 - 98.5 %    ARTERIAL O2 HGB 96.6 94.0 - 97.0 %    CARBOXYHEMOGLOBIN 0.9 0.5 - 1.5 %    METHEMOGLOBIN 0.2 0.0 - 1.5 %    DEOXYHEMOGLOBIN 2 0.0 - 5.0 %    SITE RB     ALLENS TEST NA     MODE PRV     FIO2 50.0 %    PEEP/CPAP 8.0      Respiratory comment:       Kenya Sarabia rn at 3 1 2017 2 21 24 PM. Read back. [facility]   CBC WITH AUTOMATED DIFF    Collection Time: 03/01/17  2:35 PM   Result Value Ref Range    WBC 13.8 (H) 4.3 - 11.1 K/uL    RBC 2.90 (L) 4.05 - 5.25 M/uL    HGB 9.6 (L) 11.7 - 15.4 g/dL    HCT 31.2 (L) 35.8 - 46.3 %    .6 (H) 79.6 - 97.8 FL    MCH 33.1 (H) 26.1 - 32.9 PG    MCHC 30.8 (L) 31.4 - 35.0 g/dL    RDW 14.6 11.9 - 14.6 %    PLATELET 389 (L) 186 - 450 K/uL    MPV 11.6 10.8 - 14.1 FL    NEUTROPHILS 86 %    LYMPHOCYTES 11 %    MONOCYTES 1 %    BAND NEUTROPHILS 1 0 - 10 %    METAMYELOCYTES 1 %    NRBC 1.0  WBC    ABS. NEUTROPHILS 12.2 (H) 1.7 - 8.2 K/UL    ABS. LYMPHOCYTES 1.5 0.5 - 4.6 K/UL    ABS. MONOCYTES 0.1 0.1 - 1.3 K/UL    RBC COMMENTS SLIGHT  MACROCYTOSIS        RBC COMMENTS SLIGHT  POLYCHROMASIA        PLATELET COMMENTS ADEQUATE      DF MANUAL     METABOLIC PANEL, COMPREHENSIVE    Collection Time: 03/01/17  2:35 PM   Result Value Ref Range    Sodium 140 136 - 145 mmol/L    Potassium 5.6 (H) 3.5 - 5.1 mmol/L    Chloride 99 98 - 107 mmol/L    CO2 18 (L) 21 - 32 mmol/L    Anion gap 23 (H) 7 - 16 mmol/L    Glucose 58 (L) 65 - 100 mg/dL    BUN 52 (H) 8 - 23 MG/DL    Creatinine 8.67 (H) 0.6 - 1.0 MG/DL    GFR est AA 6 (L) >60 ml/min/1.73m2    GFR est non-AA 5 (L) >60 ml/min/1.73m2    Calcium 9.3 8.3 - 10.4 MG/DL    Bilirubin, total 0.9 0.2 - 1.1 MG/DL    ALT (SGPT) 101 (H) 12 - 65 U/L    AST (SGOT) 258 (H) 15 - 37 U/L    Alk.  phosphatase 134 50 - 136 U/L    Protein, total 6.4 6.3 - 8.2 g/dL    Albumin 2.1 (L) 3.2 - 4.6 g/dL    Globulin 4.3 (H) 2.3 - 3.5 g/dL    A-G Ratio 0.5 (L) 1.2 - 3.5     LACTIC ACID, PLASMA    Collection Time: 03/01/17  2:35 PM   Result Value Ref Range    Lactic acid 8.7 (HH) 0.4 - 2.0 MMOL/L   TROPONIN I    Collection Time: 03/01/17  2:35 PM   Result Value Ref Range    Troponin-I, Qt. 0.62 (HH) 0.02 - 0.05 NG/ML   PROTHROMBIN TIME + INR    Collection Time: 03/01/17  2:35 PM   Result Value Ref Range    Prothrombin time 18.5 (H) 9.6 - 12.0 sec    INR 1.8 (H) 0.9 - 1.2     VANCOMYCIN, RANDOM    Collection Time: 03/01/17  2:47 PM   Result Value Ref Range    VANCOMYCIN,RANDOM 25.1 UG/ML   EKG, 12 LEAD, INITIAL    Collection Time: 03/01/17  4:16 PM   Result Value Ref Range    Systolic BP  mmHg    Diastolic BP  mmHg    Ventricular Rate 84 BPM    Atrial Rate 84 BPM    P-R Interval 183 ms    QRS Duration 125 ms    Q-T Interval 408 ms    QTC Calculation (Bezet) 482 ms    Calculated P Axis 63 degrees    Calculated R Axis -116 degrees    Calculated T Axis 85 degrees    Diagnosis       Normal sinus rhythm  Right bundle branch block  Abnormal ECG     BLOOD GAS, ARTERIAL    Collection Time: 03/01/17  5:05 PM   Result Value Ref Range    pH 7.30 (L) 7.35 - 7.45      PCO2 34 (L) 35 - 45 mmHg    PO2 76 75.0 - 100.0 mmHg    BICARBONATE 16 (L) 22 - 26 mmol/L    BASE DEFICIT 8.9 (H) 0 - 2 mmol/L    SITE LB     ALLENS TEST NA     MODE PRVC     FIO2 40.0 %    Tidal volume 600.0      PEEP/CPAP 8.0      Respiratory comment:       Jacob eLvy at 3 1 2017 5 17 57 PM. Read back. [facility]    BLOOD GAS, ARTERIAL    Collection Time: 03/01/17  6:16 PM   Result Value Ref Range    pH 7.24 (LL) 7.35 - 7.45      PCO2 45 35 - 45 mmHg    PO2 253 (H) 75.0 - 100.0 mmHg    BICARBONATE 19 (L) 22 - 26 mmol/L    BASE DEFICIT 8.0 (H) 0 - 2 mmol/L    TOTAL HEMOGLOBIN 10.5 (L) 11.7 - 15.0 GM/DL    O2 SAT 99 (H) 92.0 - 98.5 %    ARTERIAL O2 HGB 98.1 (H) 94.0 - 97.0 %    CARBOXYHEMOGLOBIN 0.3 (L) 0.5 - 1.5 %    METHEMOGLOBIN 0.3 0.0 - 1.5 %    DEOXYHEMOGLOBIN 1 0.0 - 5.0 %    SITE RB     ALLENS TEST NA     MODE PRVC     FIO2 100.0 %    Tidal volume 600.0      RATE 15.0      PEEP/CPAP 8.0      Respiratory comment: parker at 3 1 2017 6 23 53 PM. Not read back. METABOLIC PANEL, BASIC    Collection Time: 03/01/17  6:18 PM   Result Value Ref Range    Sodium 145 136 - 145 mmol/L    Potassium 5.8 (H) 3.5 - 5.1 mmol/L    Chloride 100 98 - 107 mmol/L    CO2 19 (L) 21 - 32 mmol/L    Anion gap 26 (H) 7 - 16 mmol/L    Glucose 36 (LL) 65 - 100 mg/dL    BUN 56 (H) 8 - 23 MG/DL    Creatinine 8.95 (H) 0.6 - 1.0 MG/DL    GFR est AA 6 (L) >60 ml/min/1.73m2    GFR est non-AA 5 (L) >60 ml/min/1.73m2    Calcium 10.4 8.3 - 10.4 MG/DL     EKG revealed sinus tachycardia and nonspecific ST depression. Does not really change much from EKG earlier today. In the department the patient was given IV glucose after the arrest.  During the arrest she was given 2 mg of epinephrine, 2 A bicarbonate, 1 amp calcium chloride.   She went to a tachycardic rhythm after initial asystole and was defibrillated into asystole on the second attempt. Wide-complex tachycardia degenerated into V. Fib. No occasions were continued and she slowly developed a sinus rhythm became sinus tachycardia. At that point she manifested a pulse and blood pressure. Some agonal respirations on room. The previous drips of vasopressin and Levophed were continued. Bicarbonate drip has been initiated. Hospitalist was present during the rest and assumed care of the patient.    ===================================================================  This patient is critically ill and there is a high probability of of imminent or life threatening deterioration in the patient's condition without immediate management. The nature of the patient's clinical problem is: Cardiac arrest    I have spent 35 minutes in direct patient care, documentation, review of labs/xrays/old records, discussion with Staff . The time involved in the performance of separately reportable procedures was not counted toward critical care time.      Jude Frank MD; 3/1/2017 @6:53 PM  ===================================================================

## 2017-03-01 NOTE — ED NOTES
Of the note patient is already being treated for sepsis and infection during this hospitalization already.

## 2017-03-01 NOTE — PROGRESS NOTES
Responded to code blue in ER as pt was asystole while being loaded into ambulance. cpr initiated and pt given epi x2, bicarb x 2, calcium gluconate. vfib noted on monitor and pt shocked into asystole. cpr continued and pt successfully resuscitated. Transport to [facility] cancelled. Discussed with family pt is not likely to survive the multiple events that have occurred today.   Family on their way back to [facility]

## 2017-03-01 NOTE — PROGRESS NOTES
Transport here to take patient downtown for dialysis. Bed downtown is not clean yet. Patient placed on monitor by transport. Waiting on phone call from [facility] before leaving [place]. Family informed and waiting at bedside.

## 2017-03-01 NOTE — PROGRESS NOTES
Call received that ICU bed available downtown. ALS transport staff ready to transfer patient. Monitor in place.

## 2017-03-01 NOTE — LETTER
[facility] EMERGENCY DEPT 
[address] [place] [id number] [phone] Work/School Note Date: 3/1/2017 To Whom It May concern: 
 
Zaria Mina was with family, that was  seen and treated today in the emergency room by the following provider(s): 
Attending Provider: Daya Solis. Melissa Curry MD.   
 
Zaria Mina may return to school on 3/3/17., may return to work on 3/3/17.  
 
Sincerely, 
 
 
 
 
Trey Mcburney, RN

## 2017-03-01 NOTE — PERIOP NOTES
Late entry, anesthesia rounds done this morning at 0715. Patient very sedate but able to arouse. Nursing staff at bedside taking vital signs.

## 2017-03-01 NOTE — ED TRIAGE NOTES
Patient was in the process of being transferred DT ICU, EMS Carla Needs was in the ambulance and patient went into cardiac arrest) er staff was called to ambulance bay. Patient was brought to RM 6, chest compression continued. Dr. Jason Mathews @ bedside. . Dr. Alonso Monroe and Elsa Vegas at bedside.

## 2017-03-01 NOTE — PROGRESS NOTES
Patient is drowsy. Awakens briefly to verbal stimuli. Resting quietly with eyes closed at this time. Family at bedside. Dressing to surgical site is dry and intact. Does not follow commands. Roth draining clear chevy urine to bag. O2 @ 4L via nasal canula. HOB elevated. Continuous pulse oximetry in place. No s/sx of pain. Bed low and locked. Call light within reach. Family instructed to call for assistance or any changes. Spouse and daughter verbalize understanding. Will monitor.

## 2017-03-02 PROBLEM — G93.1 ANOXIC BRAIN INJURY (HCC): Status: ACTIVE | Noted: 2017-01-01

## 2017-03-02 PROBLEM — I46.9 CARDIOPULMONARY ARREST WITH SUCCESSFUL RESUSCITATION (HCC): Status: ACTIVE | Noted: 2017-01-01

## 2017-03-02 PROBLEM — E87.20 LACTIC ACIDOSIS: Status: ACTIVE | Noted: 2017-01-01

## 2017-03-02 NOTE — CONSULTS
CONSULT NOTE    Dewight Boeck    3/2/2017    Date of Admission:  3/1/2017    The patient's chart is reviewed and the patient is discussed with the staff. Subjective: The patient is a 70 y.o.  female seen and evaluated at the request of Dr. Tristan Snell for management of respiratory failure post cardiac arrest.     She has a hx of ESRD, obesity, DM, HTN, JANIS and underwent surgery by Dr. Stef White yesterday for an infected R knee. Post op she developed a cardiac arrest and clearly aspirated. She was resuscitated and intubated by anesthesia with a central line placement as well. She was initially stabilized in the ICU here and arrangements were made to transport her to [facility] as she requires dialysis. Unfortunately she arrested just as she was being placed in the EMS [facility] for transport and she was moved to the ER where she has been maintained on the ventilator. She has been requiring pressors for BP support and is febrile to 103*. She is on Vanc and Zosyn per ID recommendations and is not on sedation. She has no current spontaneous movement. Her daughter is at the bedside and indicates her mother would not want to be kept alive by machines. She is a DNR. The patient's  is not here who makes her healthcare decisions. Review of Systems  Review of systems not obtained due to patient factors.     Patient Active Problem List   Diagnosis Code    Gangrene of toe ([facility] [address] I96    Diabetes mellitus ([address] E11.9    HTN (hypertension) I10    ARF (acute renal failure) ([facility]) N17.9    KIM (acute kidney injury) ([facility] [address] N17.9    ESRD (end stage renal disease) on dialysis ([address] N18.6, Z99.2    Nephrotic syndrome N04.9    Edema R60.9    Osteomyelitis of toe- LEFT 2nd toe M86.9    Localized osteoarthrosis not specified whether primary or secondary, lower leg M17.9    S/P total knee replacement Z96.659    Morbid obesity with BMI of 40.0-44.9, adult ([facility]) E66.01, Z68.41    GERD (gastroesophageal reflux disease) K21.9    Hypothyroidism E03.9    S/P total knee arthroplasty Z96.659    Acute respiratory failure ([facility]) J96.00    Volume overload E87.70    HCAP (healthcare-associated pneumonia) J18.9    Acute URI J06.9    Chest congestion R09.89    Sinus pressure J34.89    Diabetic toe ulcer ([facility]) E11.621, L97.509    Venous stasis ulcer ([facility]) I83.009, L97.909    Rash and other nonspecific skin eruption R21    Infected prosthetic knee joint ([facility]) T84.59XA, Z96.659    Anemia of chronic disease D63.8    Acute encephalopathy G93.40    Type 2 diabetes 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, with long-term current use of insulin ([facility]) E11.22, N18.6, Z99.2, Z79.4    Atrial flutter ([facility]) I48.92    Septic shock ([facility]) A41.9, R65.21    DNR (do not resuscitate) Z66    Cardiopulmonary arrest with successful resuscitation ([facility] [address] I46.9    Anoxic brain injury ([facility] [address] G93.1    Lactic acidosis E87.2         Prior to Admission Medications   Prescriptions Last Dose Informant Patient Reported? Taking? FLUoxetine (PROZAC) 20 mg capsule   No No   Sig: Take 1 Cap by mouth daily. Indications: DEPRESSION ASSOCIATED WITH BIPOLAR DISORDER   HYDROcodone-acetaminophen (NORCO) 7.5-325 mg per tablet   No No   Sig: Take 1 Tab by mouth two (2) times a day. Max Daily Amount: 2 Tabs. HYDROcodone-acetaminophen (NORCO) 7.5-325 mg per tablet   No No   Sig: Take 1 Tab by mouth two (2) times a day. Max Daily Amount: 2 Tabs. HYDROcodone-acetaminophen (NORCO) 7.5-325 mg per tablet   No No   Sig: Take 1 Tab by mouth two (2) times a day. Max Daily Amount: 2 Tabs. HYDROcodone-homatropine (HYCODAN) 5-1.5 mg/5 mL (5 mL) syrup   No No   Sig: Take 5 mL by mouth four (4) times daily. Max Daily Amount: 20 mL. LORazepam (ATIVAN) 0.5 mg tablet   No No   Sig: Take 1 Tab by mouth three (3) times daily.  Max Daily Amount: 1.5 mg.   acetaminophen (TYLENOL EXTRA STRENGTH) 500 mg tablet   No No   Sig: Take 1.5 tablets by mouth every six (6) hours as needed. Take if needed DOS per anesthesia protocol. carvedilol (COREG) 12.5 mg tablet   No No   Sig: Take 2 Tabs by mouth two (2) times daily (with meals). Indications: Hypertension   ciprofloxacin HCl (CIPRO) 500 mg tablet   No No   Sig: Take 1 Tab by mouth two (2) times a day. cloNIDine HCl (CATAPRES) 0.1 mg tablet   No No   Sig: Take 1 Tab by mouth two (2) times a day. dilTIAZem XR (DILACOR XR) 180 mg XR capsule   No No   Sig: Take 1 Cap by mouth daily. diphenhydrAMINE (BENADRYL) 25 mg capsule   No No   Sig: Take 1 capsule by mouth as needed. Take if needed DOS per anesthesia protocol. docusate sodium (STOOL SOFTENER) 100 mg capsule   No No   Sig: Take 1 capsule by mouth daily. doxazosin (CARDURA) 4 mg tablet   Yes No   Si mg daily. doxycycline (MONODOX) 100 mg capsule   No No   Sig: Take 1 Cap by mouth two (2) times a day. folic acid (FOLVITE) 1 mg tablet   No No   Sig: Take 1 Tab by mouth daily. furosemide (LASIX) 80 mg tablet   No No   Sig: Take 1 Tab by mouth two (2) times a day. Indications: Edema   ginkgo biloba 120 mg tab   Yes No   Sig: Take  by mouth. insulin glargine (LANTUS) 100 unit/mL injection   No No   Si Units by SubCUTAneous route nightly. Indications: DIABETES MELLITUS   irbesartan (AVAPRO) 300 mg tablet   No No   Sig: Take 1 Tab by mouth nightly. levothyroxine (SYNTHROID) 50 mcg tablet   No No   Sig: Take 1 Tab by mouth Daily (before breakfast). Indications: hypothyroidism   omeprazole (PRILOSEC) 20 mg capsule   No No   Sig: Take 1 Cap by mouth daily. Indications: GASTRIC HYPERSECRETION WITH SYSTEMIC MASTOCYTOSIS, am   pregabalin (LYRICA) 100 mg capsule   No No   Sig: Take 1 Cap by mouth three (3) times daily. Max Daily Amount: 300 mg. rOPINIRole (REQUIP) 0.5 mg tablet   No No   Sig: Take 1 Tab by mouth nightly.  Indications: Restless Legs Syndrome   sevelamer carbonate (RENVELA) 800 mg tab tab   No No   Sig: Take 1 Tab by mouth two (2) times a day. Indications: RENAL OSTEODYSTROPHY WITH HYPERPHOSPHATEMIA   silver sulfADIAZINE (SILVADENE) 1 % topical cream   No No   Sig: Apply  to affected area daily. traMADol (ULTRAM) 50 mg tablet   No No   Sig: Take 1 Tab by mouth two (2) times a day. Max Daily Amount: 100 mg.   trimethoprim (TRIMPEX) 100 mg tablet   No No   Sig: Take 1 Tab by mouth daily. trimethoprim-sulfamethoxazole (BACTRIM DS, SEPTRA DS) 160-800 mg per tablet   No No   Sig: Take 1 Tab by mouth two (2) times a day. vit B Cmplx 3-FA-Vit C-Biotin (VOL-CARE RX) 1- mg-mg-mcg tablet   Yes No   Sig: Take 1 tablet by mouth daily.       Facility-Administered Medications: None       Past Medical History:   Diagnosis Date    Aortic stenosis 7/8/14    mild to moderate    Arthritis     osteoarthritis - generalized     Atrial flutter ([address] 3/1/2017    Chronic kidney disease     Stage 4    Chronic pain     right knee    Diabetes ([address] dx 1985    Type 2 -140  Hypoglycemia sx @ 79    GERD (gastroesophageal reflux disease)     controlled with meds     GERD (gastroesophageal reflux disease) 7/18/2014    Hypertension      controlled with medication    Morbid obesity ([address] 6/30/14    BMI- 40.9    MRSA infection     multiple times in toes    Psychiatric disorder     PVD (peripheral vascular disease) ([address]     Thyroid disease     hypothyroid     Past Surgical History:   Procedure Laterality Date    HX CATARACT REMOVAL Bilateral     HX HEENT Left     laser surgery     HX OPEN CHOLECYSTECTOMY  1970's    HX ORTHOPAEDIC Left 2002    foot fx repair with hardware     HX ORTHOPAEDIC Left 2006    great toe amputation  , left second toe amputation    HX ORTHOPAEDIC Left 2011     ankle tendon release     HX RETINAL DETACHMENT REPAIR Right 2/2014    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      Social History Main Topics    Smoking status: Former Smoker     Packs/day: 1.50     Years: 20.00    Smokeless tobacco: Never Used      Comment: quit 21 years ago    Alcohol use No      Comment: quit 20 years ago     Drug use: No    Sexual activity: Not on file     Other Topics Concern    Not on file     Social History Narrative    Lives with     Currently using w/c and walker for mobility        PCP: Dr. Candy Rascon    Cardiology: Dr. Eleno Jewell     Family History   Problem Relation Age of Onset    Diabetes Mother     Hypertension Mother     Diabetes Father     Hypertension Father     Heart Attack Sister     Heart Attack Brother     Heart Attack Paternal Aunt     Heart Attack Paternal Uncle     Malignant Hyperthermia Neg Hx     Pseudocholinesterase Deficiency Neg Hx     Delayed Awakening Neg Hx     Post-op Nausea/Vomiting Neg Hx     Emergence Delirium Neg Hx     Post-op Cognitive Dysfunction Neg Hx     Other Neg Hx      Allergies   Allergen Reactions    Iodinated Contrast Media - Oral And Iv Dye Hives and Itching       Current Facility-Administered Medications   Medication Dose Route Frequency    sodium chloride (NS) flush 5-10 mL  5-10 mL IntraVENous Q8H    sodium chloride (NS) flush 5-10 mL  5-10 mL IntraVENous PRN    pantoprazole (PROTONIX) 40 mg in sodium chloride 0.9 % 10 mL injection  40 mg IntraVENous Q24H    midazolam (VERSED) injection 1-2 mg  1-2 mg IntraVENous Q2H PRN    morphine injection 1 mg  1 mg IntraVENous Q4H PRN    acetaminophen (TYLENOL) suppository 975 mg  975 mg Rectal Q4H PRN    sodium bicarbonate (8.4%) 150 mEq in dextrose 5% 1,000 mL infusion   IntraVENous CONTINUOUS    NOREPINephrine (LEVOPHED) 16,000 mcg in dextrose 5% 250 mL infusion  2-30 mcg/min IntraVENous TITRATE    ondansetron (ZOFRAN) injection 4 mg  4 mg IntraVENous Q4H PRN    vancomycin (VANCOCIN) 1750 mg in  ml infusion  1,750 mg IntraVENous See Admin Instructions    vasopressin (VASOSTRICT) 100 Units in 0.9% sodium chloride 100 mL infusion  0.01-0.04 Units/min IntraVENous TITRATE    heparin (porcine) injection 5,000 Units  5,000 Units SubCUTAneous Q12H    piperacillin-tazobactam (ZOSYN) 3.375 g in 0.9% sodium chloride (MBP/ADV) 100 mL  3.375 g IntraVENous Q12H    insulin lispro (HUMALOG) injection   SubCUTAneous Q6H     Current Outpatient Prescriptions   Medication Sig    silver sulfADIAZINE (SILVADENE) 1 % topical cream Apply  to affected area daily.  carvedilol (COREG) 12.5 mg tablet Take 2 Tabs by mouth two (2) times daily (with meals). Indications: Hypertension    cloNIDine HCl (CATAPRES) 0.1 mg tablet Take 1 Tab by mouth two (2) times a day.  dilTIAZem XR (DILACOR XR) 180 mg XR capsule Take 1 Cap by mouth daily.  folic acid (FOLVITE) 1 mg tablet Take 1 Tab by mouth daily.  FLUoxetine (PROZAC) 20 mg capsule Take 1 Cap by mouth daily. Indications: DEPRESSION ASSOCIATED WITH BIPOLAR DISORDER    irbesartan (AVAPRO) 300 mg tablet Take 1 Tab by mouth nightly.  levothyroxine (SYNTHROID) 50 mcg tablet Take 1 Tab by mouth Daily (before breakfast). Indications: hypothyroidism    omeprazole (PRILOSEC) 20 mg capsule Take 1 Cap by mouth daily. Indications: GASTRIC HYPERSECRETION WITH SYSTEMIC MASTOCYTOSIS, am    rOPINIRole (REQUIP) 0.5 mg tablet Take 1 Tab by mouth nightly. Indications: Restless Legs Syndrome    LORazepam (ATIVAN) 0.5 mg tablet Take 1 Tab by mouth three (3) times daily. Max Daily Amount: 1.5 mg.    sevelamer carbonate (RENVELA) 800 mg tab tab Take 1 Tab by mouth two (2) times a day. Indications: RENAL OSTEODYSTROPHY WITH HYPERPHOSPHATEMIA    furosemide (LASIX) 80 mg tablet Take 1 Tab by mouth two (2) times a day. Indications: Edema    pregabalin (LYRICA) 100 mg capsule Take 1 Cap by mouth three (3) times daily. Max Daily Amount: 300 mg.  traMADol (ULTRAM) 50 mg tablet Take 1 Tab by mouth two (2) times a day. Max Daily Amount: 100 mg.    HYDROcodone-acetaminophen (NORCO) 7.5-325 mg per tablet Take 1 Tab by mouth two (2) times a day. Max Daily Amount: 2 Tabs.  HYDROcodone-acetaminophen (NORCO) 7.5-325 mg per tablet Take 1 Tab by mouth two (2) times a day. Max Daily Amount: 2 Tabs.  HYDROcodone-acetaminophen (NORCO) 7.5-325 mg per tablet Take 1 Tab by mouth two (2) times a day. Max Daily Amount: 2 Tabs.  trimethoprim-sulfamethoxazole (BACTRIM DS, SEPTRA DS) 160-800 mg per tablet Take 1 Tab by mouth two (2) times a day.  HYDROcodone-homatropine (HYCODAN) 5-1.5 mg/5 mL (5 mL) syrup Take 5 mL by mouth four (4) times daily. Max Daily Amount: 20 mL.  ciprofloxacin HCl (CIPRO) 500 mg tablet Take 1 Tab by mouth two (2) times a day.  doxycycline (MONODOX) 100 mg capsule Take 1 Cap by mouth two (2) times a day.  ginkgo biloba 120 mg tab Take  by mouth.  doxazosin (CARDURA) 4 mg tablet 4 mg daily.  trimethoprim (TRIMPEX) 100 mg tablet Take 1 Tab by mouth daily.  docusate sodium (STOOL SOFTENER) 100 mg capsule Take 1 capsule by mouth daily.  acetaminophen (TYLENOL EXTRA STRENGTH) 500 mg tablet Take 1.5 tablets by mouth every six (6) hours as needed. Take if needed DOS per anesthesia protocol.  diphenhydrAMINE (BENADRYL) 25 mg capsule Take 1 capsule by mouth as needed. Take if needed DOS per anesthesia protocol.  vit B Cmplx 3-FA-Vit C-Biotin (VOL-CARE RX) 1- mg-mg-mcg tablet Take 1 tablet by mouth daily.  insulin glargine (LANTUS) 100 unit/mL injection 70 Units by SubCUTAneous route nightly.  Indications: DIABETES MELLITUS         Objective:     Vitals:    03/02/17 0610 03/02/17 0718 03/02/17 0720 03/02/17 0746   BP: 147/64 133/60  145/56   Pulse: (!) 106 (!) 105  (!) 105   Resp: 20 18 25   Temp: (!) 103.1 °F (39.5 °C) (!) 103 °F (39.4 °C)     SpO2: 99% 99% 99% 98%       PHYSICAL EXAM     Constitutional:  the patient is morbidly obese and in no acute distress; she overbreathes the vent  EENMT:  Sclera clear, pupils equal, oral mucosa moist; pupils equal and minimally reactive; weak corneals bilaterally  Respiratory: few trace rhonchi bilaterally  Cardiovascular:  RRR without M,G,R  Gastrointestinal: soft and non-tender; with positive bowel sounds. Musculoskeletal: warm without cyanosis. There is trace lower leg edema. Skin:  no jaundice or rashes, some mottling of the feet bilaterally and evidence of prior amputations of her toes  Neurologic: no movement to voice or pain. No seizure activity. Psychiatric:  comatose    Chest X-ray:            Recent Labs      03/02/17   0710  03/01/17   1435  03/01/17   0635  03/01/17   0440   02/27/17   1928   WBC  16.9*  13.8*  13.1*   --    --   5.5   HGB  9.4*  9.6*  8.9*  9.2*   < >  9.5*   HCT  29.3*  31.2*  28.7*   --    --   29.7*   PLT  132*  131*  146*   --    --   128*   INR   --   1.8*   --    --    --   1.2    < > = values in this interval not displayed. Recent Labs      03/02/17   0710  03/01/17   1818  03/01/17   1435   03/01/17   0350  03/01/17   0115   NA  138  145  140   < >   --    --    K  5.0  5.8*  5.6*   < >   --    --    CL  94*  100  99   < >   --    --    GLU  294*  36*  58*   < >   --    --    CO2  27  19*  18*   < >   --    --    BUN  67*  56*  52*   < >   --    --    CREA  9.25*  8.95*  8.67*   < >   --    --    CA  8.8  10.4  9.3   < >   --    --    TROIQ   --    --   0.62*   --   0.10*  0.09*   ALB  2.0*   --   2.1*   --    --    --    TBILI  1.0   --   0.9   --    --    --    ALT  251*   --   101*   --    --    --    SGOT  923*   --   258*   --    --    --     < > = values in this interval not displayed.      Recent Labs      03/02/17   0748  03/01/17   1816  03/01/17   1705   PH  7.57*  7.24*  7.30*   PCO2  24*  45  34* PO2  343*  253*  76   HCO3  22  19*  16*     Recent Labs      03/01/17   1435  03/01/17   0817  03/01/17   0420   LAC  8.7*  6.3*  3.7*       Assessment:  (Medical Decision Making)     Hospital Problems  Date Reviewed: 12/15/2016          Codes Class Noted POA    Cardiopulmonary arrest with successful resuscitation [facility]) ICD-10-CM: I46.9  ICD-9-CM: 427.5  3/2/2017 Yes    Overview Signed 3/2/2017  1:44 AM by George Angel. Makeda Corea MD     x4 codes today         Currently hemodynamically stable. Can wean pressors. Anoxic brain injury [facility]) ICD-10-CM: G93.1  ICD-9-CM: 348.1  3/2/2017 Unknown    Appears severe at present though has brainstem function. Will take 48 hrs to truly define prognosis but appears very poor at present. Lactic acidosis ICD-10-CM: E87.2  ICD-9-CM: 276.2  3/2/2017 Unknown    Had been worsening but new level just sent. * (Principal)Septic shock ([facility] [address] ICD-10-CM: A41.9, R65.21  ICD-9-CM: 038.9, 785.52, 995.92  3/1/2017 Yes    Better. Weaning pressors    DNR (do not resuscitate) ICD-10-CM: Z66  ICD-9-CM: V49.86  3/1/2017 No    Overview Signed 3/1/2017  8:37 PM by George Angel. Makeda Corea MD     Discussed with her  and daughter, who were in agreement to continue aggressive care but to not put her through another code. They may be leaning to make her comfort measures soon as they state she would not want prolonged life support. Type 2 diabetes 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, with long-term current use of insulin (H[facility]) ICD-10-CM: E11.22, N18.6, Z99.2, Z79.4  ICD-9-CM: 250.40, 585.9, V45.11, V58.67  2/28/2017 Yes        ESRD (end stage renal disease) on dialysis [facility]) ICD-10-CM: N18.6, Z99.2  ICD-9-CM: 585.6, V45.11  8/24/2014 Yes    Overview Signed 7/12/2016  5:26 PM by Harpreet Terrell MD     Pt has HD 3 days per week. Follows regularly with Nephrology; needs to lose 23 lbs to be considered for renal transplant.          Ideally needs to be moved downtown for HD/CRRT. Acute respiratory failure ([address] ICD-10-CM: J96.00  ICD-9-CM: 518.81  8/24/2014 Yes    Gas exchange good. Respiratory alkalosis from central drive likely from sepsis and brain injury. Plan:  (Medical Decision Making)     Wean oxygen. Wean pressors. May be able to decrease bicarb a little if hemodynamics remains stable. Recheck lactate. Continue antibiotics. PC, nephrology, and cardiology consulted. If dialysis to be continued, will need to be transported downtown. --    More than 50% of the time documented was spent in face-to-face contact with the patient and in the care of the patient on the floor/unit where the patient is located. Thank you very much for this referral.  We appreciate the opportunity to participate in this patient's care. Will follow along with above stated plan.     Farhad Sheffield MD

## 2017-03-02 NOTE — ED NOTES
RT at bedside to extubate pt. Monitor turned off. Pt premedicated with morphine ativan and robinol per MD order. Family in family room. [place] at bedside. Pt granddaughter requested to stay at bedside.

## 2017-03-02 NOTE — PROGRESS NOTES
Ventilator check complete; patient has a #7. 0 ET tube secured at the 24 at the lip. Patient is not sedated. Patient is not able to follow commands. Breath sounds are clear. Trachea is midline, Negative for subcutaneous air, and chest excursion is symmetric. Patient is also Negative for cyanosis and is Positive for pitting edema. All alarms are set and audible. Resuscitation bag is at the head of the bed.       Ventilator Settings  Mode FIO2 Rate Tidal Volume Pressure PEEP I:E Ratio   PRVC  100 %   17 600 ml     8 cm H20  8      Peak airway pressure: 30 cm H2O   Minute ventilation: 14.7 l/min   FiO2 decreased to 40%  ABG:   Recent Labs      03/02/17   0748  03/01/17   1816  03/01/17   1705   PH  7.57*  7.24*  7.30*   PCO2  24*  45  34*   PO2  343*  253*  76   HCO3  22  19*  16*         Keny Pendleton, RT

## 2017-03-02 NOTE — CONSULTS
Discussed with Dr. David Lea and Dr. Georgina Woody. Family wishes to proceed with compassionate extubation. Available if needed.

## 2017-03-02 NOTE — ED NOTES
Dr Arleen Haile, Nephrologist called in response to page regarding consult and states that he saw pt yesterday.

## 2017-03-02 NOTE — ED NOTES
(LE) daughter at the bedside. Pt remains unresponsive to verbal commands.   Does withdraw slightly to noxious stimuli

## 2017-03-02 NOTE — PROGRESS NOTES
Compassionate extubation; patient extubated to a 2L NC. Patient is negative for stridor. Breath sounds are coarse. No complications with extubation. Family at bedside.

## 2017-03-02 NOTE — PROGRESS NOTES
Progress Note    Spoke with Family. They wish to withdraw care as this is not what the pt would want. Also spoke with the patients  over the phone. He wants care withdrawn but will not be coming back to the hospital because he cannot handle that right now.

## 2017-03-02 NOTE — DISCHARGE SUMMARY
Patient ID:  Acacia Isabel  [id number]  41 y.o.  1945  Admit date: 3/1/2017  6:02 PM  Discharge date and time: 3/2/2017  Attending: Lisa Macario. Mark Kat MD  PCP:  José Antonio Jacob MD  Treatment Team: Attending Provider: Lisa Macario. Mark Kat MD; Consulting Provider: Cici Garcia MD; Consulting Provider: Luis Hunt MD; Primary Nurse: Ly De Oliveira RN    Principal Diagnosis Septic shock [facility])   Principal Problem:    Septic shock [facility]) (3/1/2017)    Active Problems:    ESRD (end stage renal disease) on dialysis ([address] (8/24/2014)      Overview: Pt has HD 3 days per week. Follows regularly with Nephrology; needs to       lose 23 lbs to be considered for renal transplant. Acute respiratory failure (HCC) (8/24/2014)      Type 2 diabetes 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, with long-term current use of insulin ([address] (2/28/2017)      DNR (do not resuscitate) (3/1/2017)      Overview: Discussed with her  and daughter, who were in agreement to continue       aggressive care but to not put her through another code. They may be       leaning to make her comfort measures soon as they state she would not want       prolonged life support. Cardiopulmonary arrest with successful resuscitation ([address] (3/2/2017)      Overview: x4 codes today      Anoxic brain injury ([address] (3/2/2017)      Lactic acidosis (3/2/2017)      Gram-positive cocci bacteremia (3/4/2017)      Aspiration pneumonia ([address] (3/4/2017)             Hospital Course:  Please refer to the admission H&P for details of presentation. In summary,  Mrs. Nasrin Parish is a 71 yo F initially admitted 2/28/17 for R total knee revision and is POD 1. After surgery, she was very somnolent and felt to be related to narcotics received eliazar-operatively along with her ESRD. She was given narcan and became more responsive overnight. She developed low grade fevers and had her abx broadened from vanc to vanc and zosyn.  She became hypotensive around 5:45 AM. During the day, she declined further and had a code blue called. Ended up being intubated, started on the vent, and several pressors started. Attempt was made to transfer her downtown so she was discharged. Unfortunately she coded twice during transfer (once in ICU where she became bradycardic and lost pulse, then she went PEA prior to leaving [facility]. She was returned back to [facility] ER and pulse regained. She was placed on levophed, vasopressin, and a bicarb gtt. Of note, she coded 4 times 03/01/2017.     Her daughter and granddaughter came to the bedside. Her  as well. Tenuous status discussed with them. They wanted to continue care but made her dnr. Unfortunately she remained unresponsive without sedation. After discussion with the care team today the family decided to withdraw care. Her  did not come back to the hospital because he could not deal with being present. We spoke with him by phone called her  and he also wanted to withdraw care. Care was withdrawn and she was pronounced dead @ 10:31 am. Family wants her cremated.     Significant Diagnostic Studies:       Labs: Results:       Chemistry Recent Labs      03/02/17   0710  03/01/17   1818  03/01/17   1435   GLU  294*  36*  58*   NA  138  145  140   K  5.0  5.8*  5.6*   CL  94*  100  99   CO2  27  19*  18*   BUN  67*  56*  52*   CREA  9.25*  8.95*  8.67*   CA  8.8  10.4  9.3   AGAP  17*  26*  23*   AP  132   --   134   TP  6.2*   --   6.4   ALB  2.0*   --   2.1*   GLOB  4.2*   --   4.3*   AGRAT  0.5*   --   0.5*      CBC w/Diff Recent Labs      03/02/17   0710  03/01/17   1435  03/01/17   0635   02/27/17   1928   WBC  16.9*  13.8*  13.1*   --   5.5   RBC  2.88*  2.90*  2.72*   --   2.91*   HGB  9.4*  9.6*  8.9*   < >  9.5*   HCT  29.3*  31.2*  28.7*   --   29.7*   PLT  132*  131*  146*   --   128*   GRANS  90*  86  78   --   78   LYMPH  9*  11  13   --   11*   EOS   --    --   0* --   1    < > = values in this interval not displayed. Cardiac Enzymes No results for input(s): CPK, CKND1, RADHA in the last 72 hours. No lab exists for component: CKRMB, TROIP   Coagulation Recent Labs      03/01/17   1435  02/27/17   1928   PTP  18.5*  12.5*   INR  1.8*  1.2   APTT   --   43.2*       Lipid Panel No results found for: CHOL, CHOLPOCT, CHOLX, CHLST, CHOLV, [id number], HDL, LDL, NLDLCT, DLDL, LDLC, DLDLP, [id number], VLDLC, VLDL, TGL, TGLX, TRIGL, [id number], TRIGP, TGLPOCT, [id number], CHHD, CHHDX   BNP No results for input(s): BNPP in the last 72 hours. Liver Enzymes Recent Labs      03/02/17   0710   TP  6.2*   ALB  2.0*   AP  132   SGOT  923*      Thyroid Studies No results found for: T4, T3U, TSH, TSHEXT       Results for orders placed or performed during the hospital encounter of 03/01/17   EKG, 12 LEAD, INITIAL   Result Value Ref Range    Systolic BP  mmHg    Diastolic BP  mmHg    Ventricular Rate 123 BPM    Atrial Rate 117 BPM    P-R Interval  ms    QRS Duration 112 ms    Q-T Interval 474 ms    QTC Calculation (Bezet) 678 ms    Calculated P Axis  degrees    Calculated R Axis 56 degrees    Calculated T Axis -62 degrees    Diagnosis       Sinus tachycardia  Low voltage QRS  Septal infarct , age undetermined  Lateral injury pattern  Abnormal ECG  When compared with ECG of 01-MAR-2017 16:16,  Sinus rhythm  Right bundle branch block is no longer Present  Septal infarct is now Present  Confirmed by John Paul Doss MD (), NUSRAT MUNSON ([id number]) on 3/2/2017 7:03:05 AM       CT Results (most recent):    Results from Hospital Encounter encounter on 08/21/13   CT HEAD WO CONT   Narrative CT OF THE HEAD WITHOUT CONTRAST. CLINICAL INDICATION: Facial droop    PROCEDURE: Serial thin section axial images are obtained from the cranial  vertex through the skull base without the administration of intravenous  contrast.    COMPARISON: None. FINDINGS: There is no acute intracranial hemorrhage, mass, or mass effect. No  abnormal extra-axial fluid collections identified. There is no hydrocephalus. The basilar cisterns are widely patent. Lacunar infarction is noted in the  right caudate and in the right midbrain. Otherwise, the gray-white matter brain  parenchymal interface is well-maintained. No skull fracture or aggressive  osseous lesion noted. The mastoid air cells are underpneumatized bilaterally. Air-fluid levels are noted in the sphenoid chambers bilaterally. Impression IMPRESSION:  1. No acute intracranial abnormality. 2. Air-fluid levels in the sphenoid sinuses may represent sinusitis in the  appropriate clinical scenario. 3. Old lacunar infarctions in the right caudate and right aspect of the midbrain      VAS/US Results (most recent):    Results from Office Visit encounter on 04/27/15   DUPLEX LOW EXT ARTERIES WITH HEATHER   Narrative See scanned final report. XR Results (most recent):    Results from Hospital Encounter encounter on 02/27/17   XR CHEST PORT   Narrative AP chest radiograph    History: ETT and line placement, 71 years Female    Comparison: Chest radiograph February 28, 2017    Findings:  Interval intubation, the endotracheal tube tip terminates  approximately 6 cm above the violet. A new left subclavian approach central  venous line appears to terminate in the superior vena cava. Persistent mild  apparent cardiomegaly. Persistent slightly low lung volumes especially on the  right. Mild diffuse interstitial prominence unchanged and nonspecific. No  evidence of pneumothorax, pleural effusion. Visualized soft tissue and osseous  structures otherwise unremarkable. Impression Impression:  Lines and tubes as above. Otherwise no significant interval  change.           Discharge Exam:  Visit Vitals    BP (!) 71/39    Pulse (!) 0    Temp (!) 103 °F (39.4 °C)    Resp (!) 0    SpO2 (!) 49%     General appearance: alert, cooperative, no distress, appears stated age  Lungs: clear to auscultation bilaterally  Heart: regular rate and rhythm, S1, S2 normal, no murmur, click, rub or gallop  Abdomen: soft, non-tender.  Bowel sounds normal. No masses,  no organomegaly  Extremities: no cyanosis or edema  Neurologic: Grossly normal    Disposition: cremation  Discharge Condition: stable  Patient Instructions:   Current Discharge Medication List      STOP taking these medications       silver sulfADIAZINE (SILVADENE) 1 % topical cream Comments:   Reason for Stopping:         carvedilol (COREG) 12.5 mg tablet Comments:   Reason for Stopping:         cloNIDine HCl (CATAPRES) 0.1 mg tablet Comments:   Reason for Stopping:         dilTIAZem XR (DILACOR XR) 180 mg XR capsule Comments:   Reason for Stopping:         folic acid (FOLVITE) 1 mg tablet Comments:   Reason for Stopping:         FLUoxetine (PROZAC) 20 mg capsule Comments:   Reason for Stopping:         irbesartan (AVAPRO) 300 mg tablet Comments:   Reason for Stopping:         levothyroxine (SYNTHROID) 50 mcg tablet Comments:   Reason for Stopping:         omeprazole (PRILOSEC) 20 mg capsule Comments:   Reason for Stopping:         rOPINIRole (REQUIP) 0.5 mg tablet Comments:   Reason for Stopping:         LORazepam (ATIVAN) 0.5 mg tablet Comments:   Reason for Stopping:         sevelamer carbonate (RENVELA) 800 mg tab tab Comments:   Reason for Stopping:         furosemide (LASIX) 80 mg tablet Comments:   Reason for Stopping:         pregabalin (LYRICA) 100 mg capsule Comments:   Reason for Stopping:         traMADol (ULTRAM) 50 mg tablet Comments:   Reason for Stopping:         HYDROcodone-acetaminophen (NORCO) 7.5-325 mg per tablet Comments:   Reason for Stopping:         HYDROcodone-acetaminophen (NORCO) 7.5-325 mg per tablet Comments:   Reason for Stopping:         HYDROcodone-acetaminophen (NORCO) 7.5-325 mg per tablet Comments:   Reason for Stopping:         trimethoprim-sulfamethoxazole (BACTRIM DS, SEPTRA DS) 160-800 mg per tablet Comments: Reason for Stopping:         HYDROcodone-homatropine (HYCODAN) 5-1.5 mg/5 mL (5 mL) syrup Comments:   Reason for Stopping:         ciprofloxacin HCl (CIPRO) 500 mg tablet Comments:   Reason for Stopping:         meloxicam (MOBIC) 7.5 mg tablet Comments:   Reason for Stopping:         doxycycline (MONODOX) 100 mg capsule Comments:   Reason for Stopping:         ginkgo biloba 120 mg tab Comments:   Reason for Stopping:         doxazosin (CARDURA) 4 mg tablet Comments:   Reason for Stopping:         trimethoprim (TRIMPEX) 100 mg tablet Comments:   Reason for Stopping:         docusate sodium (STOOL SOFTENER) 100 mg capsule Comments:   Reason for Stopping:         acetaminophen (TYLENOL EXTRA STRENGTH) 500 mg tablet Comments:   Reason for Stopping:         diphenhydrAMINE (BENADRYL) 25 mg capsule Comments:   Reason for Stopping:         cholecalciferol, VITAMIN D3, (VITAMIN D3) 5,000 unit tab tablet Comments:   Reason for Stopping:         vit B Cmplx 3-FA-Vit C-Biotin (VOL-CARE RX) 1- mg-mg-mcg tablet Comments:   Reason for Stopping:         insulin glargine (LANTUS) 100 unit/mL injection Comments:   Reason for Stopping:                    Follow-up  ·   Cremation  Time spent to discharge patient greater than 30 minutes  Signed:  Kyler Valverde MD  3/2/2017  10:39 AM

## 2017-03-02 NOTE — PROGRESS NOTES
Present for patient and patient's family following CODE procedures and transfer to ICU    Juliano Barrera III, PhD  [facility]  [phone]

## 2017-03-02 NOTE — PROGRESS NOTES
Called at 1220 to assist in emergency endotracheal intubation. Upon my arrival, full resuscitative efforts were underway, including chest compressions, for cardiac arrest.  Large amounts of bilious fluid were flowing from patient's mouth and nostrils. Reportedly, respiratory therapist had been unsuccessful in his attempt to intubate the patient. After brief but vigorous oropharyngeal suction, I was able to atraumatically intubate the patient with a 7.0 cuffed ET tube via direct laryngoscopy, utilizing a Mac 4 blade. Correct placement was confirmed with auscultation of bilateral breath sounds at mid-axillary line. Tube depth was 23 cm at upper incisors. No apparent complications.

## 2017-03-02 NOTE — H&P
HOSPITALIST H&P/CONSULT  NAME:  Tal Morgan   Age:  70 y.o.  :   1945   MRN:   [id number]  PCP: Lesly Pereira MD  Consulting MD:  Treatment Team: Attending Provider: Bridget Pelayo MD; Primary Nurse: Prema Byrne RN  HPI:   Mrs. Lorenza Chang is a 69 yo F initially admitted 17 for R total knee revision and is POD 1. After surgery, she was very somnolent and felt to be related to narcotics received eliazar-operatively along with her ESRD. She was given narcan and became more responsive overnight. She developed low grade fevers and had her abx broadened from vanc to vanc and zosyn. She became hypotensive around 5:45 AM.  During the day, she declined further and had a code blue called. Ended up being intubated, started on the vent, and several pressors started. Attempt was made to transfer her downtown and she coded twice during transfer (once in ICU where she became bradycardic and lost pulse, then she went PEA prior to leaving [facility]. She was returned back to [facility] ER and pulse regained. Currently, she is on levophed, vasopressin, and a bicarb gtt. Of note, she coded 4 times today. Her daughter and granddaughter are at bedside. Tenuous status discussed with them. Also, called her  and discussed with him. They are agreeable to having her code status transitioned to DNR, but want to continue aggressive therapy for now.     Complete ROS done and is as stated in HPI or otherwise negative  Past Medical History:   Diagnosis Date    Aortic stenosis 14    mild to moderate    Arthritis     osteoarthritis - generalized     Atrial flutter ([facility] [address] 3/1/2017    Chronic kidney disease     Stage 4    Chronic pain     right knee    Diabetes ([facility] [address] dx 1985    Type 2 -140  Hypoglycemia sx @ 79    GERD (gastroesophageal reflux disease)     controlled with meds     GERD (gastroesophageal reflux disease) 2014    Hypertension      controlled with medication    Morbid obesity ([facility] [address] 6/30/14    BMI- 40.9    MRSA infection     multiple times in toes    Psychiatric disorder     PVD (peripheral vascular disease) ([facility] [address]     Thyroid disease     hypothyroid      Past Surgical History:   Procedure Laterality Date    HX CATARACT REMOVAL Bilateral     HX HEENT Left     laser surgery     HX OPEN CHOLECYSTECTOMY  1970's    HX ORTHOPAEDIC Left 2002    foot fx repair with hardware     HX ORTHOPAEDIC Left 2006    great toe amputation  , left second toe amputation    HX ORTHOPAEDIC Left 2011     ankle tendon release     HX RETINAL DETACHMENT REPAIR Right 2/2014    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 Taking? FLUoxetine (PROZAC) 20 mg capsule   No No   Sig: Take 1 Cap by mouth daily. Indications: DEPRESSION ASSOCIATED WITH BIPOLAR DISORDER   HYDROcodone-acetaminophen (NORCO) 7.5-325 mg per tablet   No No   Sig: Take 1 Tab by mouth two (2) times a day. Max Daily Amount: 2 Tabs. HYDROcodone-acetaminophen (NORCO) 7.5-325 mg per tablet   No No   Sig: Take 1 Tab by mouth two (2) times a day. Max Daily Amount: 2 Tabs. HYDROcodone-acetaminophen (NORCO) 7.5-325 mg per tablet   No No   Sig: Take 1 Tab by mouth two (2) times a day. Max Daily Amount: 2 Tabs. HYDROcodone-homatropine (HYCODAN) 5-1.5 mg/5 mL (5 mL) syrup   No No   Sig: Take 5 mL by mouth four (4) times daily. Max Daily Amount: 20 mL. LORazepam (ATIVAN) 0.5 mg tablet   No No   Sig: Take 1 Tab by mouth three (3) times daily. Max Daily Amount: 1.5 mg.   acetaminophen (TYLENOL EXTRA STRENGTH) 500 mg tablet   No No   Sig: Take 1.5 tablets by mouth every six (6) hours as needed.  Take if needed DOS per anesthesia protocol. carvedilol (COREG) 12.5 mg tablet   No No   Sig: Take 2 Tabs by mouth two (2) times daily (with meals). Indications: Hypertension   ciprofloxacin HCl (CIPRO) 500 mg tablet   No No   Sig: Take 1 Tab by mouth two (2) times a day. cloNIDine HCl (CATAPRES) 0.1 mg tablet   No No   Sig: Take 1 Tab by mouth two (2) times a day. dilTIAZem XR (DILACOR XR) 180 mg XR capsule   No No   Sig: Take 1 Cap by mouth daily. diphenhydrAMINE (BENADRYL) 25 mg capsule   No No   Sig: Take 1 capsule by mouth as needed. Take if needed DOS per anesthesia protocol. docusate sodium (STOOL SOFTENER) 100 mg capsule   No No   Sig: Take 1 capsule by mouth daily. doxazosin (CARDURA) 4 mg tablet   Yes No   Si mg daily. doxycycline (MONODOX) 100 mg capsule   No No   Sig: Take 1 Cap by mouth two (2) times a day. folic acid (FOLVITE) 1 mg tablet   No No   Sig: Take 1 Tab by mouth daily. furosemide (LASIX) 80 mg tablet   No No   Sig: Take 1 Tab by mouth two (2) times a day. Indications: Edema   ginkgo biloba 120 mg tab   Yes No   Sig: Take  by mouth. insulin glargine (LANTUS) 100 unit/mL injection   No No   Si Units by SubCUTAneous route nightly. Indications: DIABETES MELLITUS   irbesartan (AVAPRO) 300 mg tablet   No No   Sig: Take 1 Tab by mouth nightly. levothyroxine (SYNTHROID) 50 mcg tablet   No No   Sig: Take 1 Tab by mouth Daily (before breakfast). Indications: hypothyroidism   omeprazole (PRILOSEC) 20 mg capsule   No No   Sig: Take 1 Cap by mouth daily. Indications: GASTRIC HYPERSECRETION WITH SYSTEMIC MASTOCYTOSIS, am   pregabalin (LYRICA) 100 mg capsule   No No   Sig: Take 1 Cap by mouth three (3) times daily. Max Daily Amount: 300 mg. rOPINIRole (REQUIP) 0.5 mg tablet   No No   Sig: Take 1 Tab by mouth nightly. Indications: Restless Legs Syndrome   sevelamer carbonate (RENVELA) 800 mg tab tab   No No   Sig: Take 1 Tab by mouth two (2) times a day.  Indications: RENAL OSTEODYSTROPHY WITH HYPERPHOSPHATEMIA   silver sulfADIAZINE (SILVADENE) 1 % topical cream   No No   Sig: Apply  to affected area daily. traMADol (ULTRAM) 50 mg tablet   No No   Sig: Take 1 Tab by mouth two (2) times a day. Max Daily Amount: 100 mg.   trimethoprim (TRIMPEX) 100 mg tablet   No No   Sig: Take 1 Tab by mouth daily. trimethoprim-sulfamethoxazole (BACTRIM DS, SEPTRA DS) 160-800 mg per tablet   No No   Sig: Take 1 Tab by mouth two (2) times a day. vit B Cmplx 3-FA-Vit C-Biotin (VOL-CARE RX) 1- mg-mg-mcg tablet   Yes No   Sig: Take 1 tablet by mouth daily.       Facility-Administered Medications: None     Allergies   Allergen Reactions    Iodinated Contrast Media - Oral And Iv Dye Hives and Itching      Social History   Substance Use Topics    Smoking status: Former Smoker     Packs/day: 1.50     Years: 20.00    Smokeless tobacco: Never Used      Comment: quit 21 years ago    Alcohol use No      Comment: quit 21 years ago       Family History   Problem Relation Age of Onset    Diabetes Mother     Hypertension Mother     Diabetes Father     Hypertension Father     Heart Attack Sister     Heart Attack Brother     Heart Attack Paternal Aunt     Heart Attack Paternal Uncle     Malignant Hyperthermia Neg Hx     Pseudocholinesterase Deficiency Neg Hx     Delayed Awakening Neg Hx     Post-op Nausea/Vomiting Neg Hx     Emergence Delirium Neg Hx     Post-op Cognitive Dysfunction Neg Hx     Other Neg Hx       Objective:     Visit Vitals    /59    Pulse 98    Temp (!) 101.1 °F (38.4 °C)    Resp 19    SpO2 100%      Temp (24hrs), Av.8 °F (37.7 °C), Min:97 °F (36.1 °C), Max:101.1 °F (38.4 °C)    Oxygen Therapy  O2 Sat (%): 100 % (17)  Pulse via Oximetry: 98 beats per minute (17)  O2 Device: Ventilator (17)  FIO2 (%): 98 % (17)  Physical Exam:  General:    Endotracheally intubated, unresponsive   Head:   Normocephalic, without obvious abnormality, atraumatic. Nose:  Nares normal. No drainage or sinus tenderness. Lungs:   Bilateral rhonchi. Heart:   Regular rate and rhythm,  no murmur, rub or gallop. Abdomen:   Soft, non-tender. Not distended. Bowel sounds normal.   Extremities: SCDs in place, L foot with 1st and 2nd toes s/p amputation  Skin:     No rashes.   Neurologic: Unresponsive   Data Review:   Recent Results (from the past 24 hour(s))   BLOOD GAS, ARTERIAL    Collection Time: 02/28/17  9:06 PM   Result Value Ref Range    pH 7.39 7.35 - 7.45      PCO2 42 35 - 45 mmHg    PO2 68 (L) 75.0 - 100.0 mmHg    BICARBONATE 25 22 - 26 mmol/L    BASE EXCESS 0.1 0 - 3 mmol/L    SITE RR     ALLENS TEST POSITIVE      MODE NC     O2 FLOW 4.00 L/min   GLUCOSE, POC    Collection Time: 02/28/17 10:23 PM   Result Value Ref Range    Glucose (POC) 133 (H) 65 - 100 mg/dL   TROPONIN I    Collection Time: 03/01/17  1:15 AM   Result Value Ref Range    Troponin-I, Qt. 0.09 (H) 0.02 - 0.05 NG/ML   TROPONIN I    Collection Time: 03/01/17  3:50 AM   Result Value Ref Range    Troponin-I, Qt. 0.10 (HH) 0.02 - 0.05 NG/ML   LACTIC ACID, PLASMA    Collection Time: 03/01/17  4:20 AM   Result Value Ref Range    Lactic acid 3.7 (HH) 0.4 - 2.0 MMOL/L   GLUCOSE, POC    Collection Time: 03/01/17  4:26 AM   Result Value Ref Range    Glucose (POC) 121 (H) 65 - 100 mg/dL   HEMOGLOBIN    Collection Time: 03/01/17  4:40 AM   Result Value Ref Range    HGB 9.2 (L) 11.7 - 12.1 g/dL   METABOLIC PANEL, BASIC    Collection Time: 03/01/17  4:40 AM   Result Value Ref Range    Sodium 138 136 - 145 mmol/L    Potassium 4.6 3.5 - 5.1 mmol/L    Chloride 99 98 - 107 mmol/L    CO2 23 21 - 32 mmol/L    Anion gap 16 7 - 16 mmol/L    Glucose 120 (H) 65 - 100 mg/dL    BUN 47 (H) 8 - 23 MG/DL    Creatinine 7.74 (H) 0.6 - 1.0 MG/DL    GFR est AA 7 (L) >60 ml/min/1.73m2    GFR est non-AA 5 (L) >60 ml/min/1.73m2    Calcium 9.4 8.3 - 10.4 MG/DL   CBC WITH AUTOMATED DIFF    Collection Time: 03/01/17  6:35 AM   Result Value Ref Range    WBC 13.1 (H) 4.3 - 11.1 K/uL    RBC 2.72 (L) 4.05 - 5.25 M/uL    HGB 8.9 (L) 11.7 - 15.4 g/dL    HCT 28.7 (L) 35.8 - 46.3 %    .5 (H) 79.6 - 97.8 FL    MCH 32.7 26.1 - 32.9 PG    MCHC 31.0 (L) 31.4 - 35.0 g/dL    RDW 14.9 (H) 11.9 - 14.6 %    PLATELET 083 (L) 002 - 450 K/uL    MPV 12.6 10.8 - 14.1 FL    DF AUTOMATED      NEUTROPHILS 78 43 - 78 %    LYMPHOCYTES 13 13 - 44 %    MONOCYTES 9 4.0 - 12.0 %    EOSINOPHILS 0 (L) 0.5 - 7.8 %    BASOPHILS 0 0.0 - 2.0 %    IMMATURE GRANULOCYTES 0.5 0.0 - 5.0 %    ABS. NEUTROPHILS 10.2 (H) 1.7 - 8.2 K/UL    ABS. LYMPHOCYTES 1.7 0.5 - 4.6 K/UL    ABS. MONOCYTES 1.1 0.1 - 1.3 K/UL    ABS. EOSINOPHILS 0.0 0.0 - 0.8 K/UL    ABS. BASOPHILS 0.0 0.0 - 0.2 K/UL    ABS. IMM. GRANS. 0.1 0.0 - 0.5 K/UL   PROCALCITONIN    Collection Time: 03/01/17  6:35 AM   Result Value Ref Range    Procalcitonin 6.3 ng/mL   LACTIC ACID, PLASMA    Collection Time: 03/01/17  8:17 AM   Result Value Ref Range    Lactic acid 6.3 (HH) 0.4 - 2.0 MMOL/L   BLOOD GAS, ARTERIAL    Collection Time: 03/01/17  1:55 PM   Result Value Ref Range    pH 7.27 (L) 7.35 - 7.45      PCO2 37 35 - 45 mmHg    PO2 139 (H) 75.0 - 100.0 mmHg    BICARBONATE 16 (L) 22 - 26 mmol/L    BASE DEFICIT 9.7 (H) 0 - 2 mmol/L    TOTAL HEMOGLOBIN 7.2 (L) 11.7 - 15.0 GM/DL    O2 SAT 98 92.0 - 98.5 %    ARTERIAL O2 HGB 96.6 94.0 - 97.0 %    CARBOXYHEMOGLOBIN 0.9 0.5 - 1.5 %    METHEMOGLOBIN 0.2 0.0 - 1.5 %    DEOXYHEMOGLOBIN 2 0.0 - 5.0 %    SITE RB     ALLENS TEST NA     MODE PRVC     FIO2 50.0 %    PEEP/CPAP 8.0      Respiratory comment:       Rakan Britton rn at 3 1 2017 2 21 24 PM. Read back.  PRVC SIMV   CBC WITH AUTOMATED DIFF    Collection Time: 03/01/17  2:35 PM   Result Value Ref Range    WBC 13.8 (H) 4.3 - 11.1 K/uL    RBC 2.90 (L) 4.05 - 5.25 M/uL    HGB 9.6 (L) 11.7 - 15.4 g/dL    HCT 31.2 (L) 35.8 - 46.3 %    .6 (H) 79.6 - 97.8 FL    MCH 33.1 (H) 26.1 - 32.9 PG    MCHC 30.8 (L) 31.4 - 35.0 g/dL    RDW 14.6 11.9 - 14.6 %    PLATELET 946 (L) 086 - 450 K/uL    MPV 11.6 10.8 - 14.1 FL    NEUTROPHILS 86 %    LYMPHOCYTES 11 %    MONOCYTES 1 %    BAND NEUTROPHILS 1 0 - 10 %    METAMYELOCYTES 1 %    NRBC 1.0  WBC    ABS. NEUTROPHILS 12.2 (H) 1.7 - 8.2 K/UL    ABS. LYMPHOCYTES 1.5 0.5 - 4.6 K/UL    ABS. MONOCYTES 0.1 0.1 - 1.3 K/UL    RBC COMMENTS SLIGHT  MACROCYTOSIS        RBC COMMENTS SLIGHT  POLYCHROMASIA        PLATELET COMMENTS ADEQUATE      DF MANUAL     METABOLIC PANEL, COMPREHENSIVE    Collection Time: 03/01/17  2:35 PM   Result Value Ref Range    Sodium 140 136 - 145 mmol/L    Potassium 5.6 (H) 3.5 - 5.1 mmol/L    Chloride 99 98 - 107 mmol/L    CO2 18 (L) 21 - 32 mmol/L    Anion gap 23 (H) 7 - 16 mmol/L    Glucose 58 (L) 65 - 100 mg/dL    BUN 52 (H) 8 - 23 MG/DL    Creatinine 8.67 (H) 0.6 - 1.0 MG/DL    GFR est AA 6 (L) >60 ml/min/1.73m2    GFR est non-AA 5 (L) >60 ml/min/1.73m2    Calcium 9.3 8.3 - 10.4 MG/DL    Bilirubin, total 0.9 0.2 - 1.1 MG/DL    ALT (SGPT) 101 (H) 12 - 65 U/L    AST (SGOT) 258 (H) 15 - 37 U/L    Alk.  phosphatase 134 50 - 136 U/L    Protein, total 6.4 6.3 - 8.2 g/dL    Albumin 2.1 (L) 3.2 - 4.6 g/dL    Globulin 4.3 (H) 2.3 - 3.5 g/dL    A-G Ratio 0.5 (L) 1.2 - 3.5     LACTIC ACID, PLASMA    Collection Time: 03/01/17  2:35 PM   Result Value Ref Range    Lactic acid 8.7 (HH) 0.4 - 2.0 MMOL/L   TROPONIN I    Collection Time: 03/01/17  2:35 PM   Result Value Ref Range    Troponin-I, Qt. 0.62 (HH) 0.02 - 0.05 NG/ML   PROTHROMBIN TIME + INR    Collection Time: 03/01/17  2:35 PM   Result Value Ref Range    Prothrombin time 18.5 (H) 9.6 - 12.0 sec    INR 1.8 (H) 0.9 - 1.2     PROCALCITONIN    Collection Time: 03/01/17  2:35 PM   Result Value Ref Range    Procalcitonin 8.3 ng/mL   VANCOMYCIN, RANDOM    Collection Time: 03/01/17  2:47 PM   Result Value Ref Range    VANCOMYCIN,RANDOM 25.1 UG/ML   EKG, 12 LEAD, INITIAL    Collection Time: 03/01/17  4:16 PM   Result Value Ref Range    Systolic BP  mmHg    Diastolic BP  mmHg    Ventricular Rate 84 BPM    Atrial Rate 84 BPM    P-R Interval 183 ms    QRS Duration 125 ms    Q-T Interval 408 ms    QTC Calculation (Bezet) 482 ms    Calculated P Axis 63 degrees    Calculated R Axis -116 degrees    Calculated T Axis 85 degrees    Diagnosis       Normal sinus rhythm  Right bundle branch block  Abnormal ECG     BLOOD GAS, ARTERIAL    Collection Time: 03/01/17  5:05 PM   Result Value Ref Range    pH 7.30 (L) 7.35 - 7.45      PCO2 34 (L) 35 - 45 mmHg    PO2 76 75.0 - 100.0 mmHg    BICARBONATE 16 (L) 22 - 26 mmol/L    BASE DEFICIT 8.9 (H) 0 - 2 mmol/L    SITE LB     ALLENS TEST NA     MODE PRVC     FIO2 40.0 %    Tidal volume 600.0      PEEP/CPAP 8.0      Respiratory comment:       Jacob Levy at 3 1 2017 5 17 57 PM. Read back. [facility]    BLOOD GAS, ARTERIAL    Collection Time: 03/01/17  6:16 PM   Result Value Ref Range    pH 7.24 (LL) 7.35 - 7.45      PCO2 45 35 - 45 mmHg    PO2 253 (H) 75.0 - 100.0 mmHg    BICARBONATE 19 (L) 22 - 26 mmol/L    BASE DEFICIT 8.0 (H) 0 - 2 mmol/L    TOTAL HEMOGLOBIN 10.5 (L) 11.7 - 15.0 GM/DL    O2 SAT 99 (H) 92.0 - 98.5 %    ARTERIAL O2 HGB 98.1 (H) 94.0 - 97.0 %    CARBOXYHEMOGLOBIN 0.3 (L) 0.5 - 1.5 %    METHEMOGLOBIN 0.3 0.0 - 1.5 %    DEOXYHEMOGLOBIN 1 0.0 - 5.0 %    SITE RB     ALLENS TEST NA     MODE PRVC     FIO2 100.0 %    Tidal volume 600.0      RATE 15.0      PEEP/CPAP 8.0      Respiratory comment: parker at 3 1 2017 6 23 53 PM. Not read back.     METABOLIC PANEL, BASIC    Collection Time: 03/01/17  6:18 PM   Result Value Ref Range    Sodium 145 136 - 145 mmol/L    Potassium 5.8 (H) 3.5 - 5.1 mmol/L    Chloride 100 98 - 107 mmol/L    CO2 19 (L) 21 - 32 mmol/L    Anion gap 26 (H) 7 - 16 mmol/L    Glucose 36 (LL) 65 - 100 mg/dL    BUN 56 (H) 8 - 23 MG/DL    Creatinine 8.95 (H) 0.6 - 1.0 MG/DL    GFR est AA 6 (L) >60 ml/min/1.73m2    GFR est non-AA 5 (L) >60 ml/min/1.73m2    Calcium 10.4 8.3 - 10.4 MG/DL     Imaging Ana Pate /Studies   No orders to display       Assessment and Plan: Active Hospital Problems    Diagnosis Date Noted    Septic shock ([facility] [address] 03/01/2017    DNR (do not resuscitate) 03/01/2017     Discussed with her  and daughter, who were in agreement to continue aggressive care but to not put her through another code. They may be leaning to make her comfort measures soon as they state she would not want prolonged life support.  Type 2 diabetes 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, with long-term current use of insulin ([facility] [address] 02/28/2017    Acute respiratory failure ([facility] [address] 08/24/2014    ESRD (end stage renal disease) on dialysis ([facility] [address] 08/24/2014     Pt has HD 3 days per week. Follows regularly with Nephrology; needs to lose 23 lbs to be considered for renal transplant. PLAN  ·  Continue vent and pressors for now. · Transition her code status to DNR based on discussion with her  and daughter. · Leave in ER room over night as she has coded every last few times at attempt of transport. · T2DM/hypoglycemia- monitor blood sugar q 6 hours. D50 prn. Code Status: DNR        Signed By: Wilder Pena.  Zander Gomes MD     March 1, 2017

## 2017-03-02 NOTE — PROGRESS NOTES
responded to Code 800 [facility]. Staff reports Patient coded as staff was preparing to transport Patient to [facility]. Family returned to [place]. Support, including scripture reading and prayer, provided. Magdalena Clifford. Roxanna Gr.

## 2017-03-02 NOTE — ED NOTES
Pt daughter states pt  told her on phone that he wanted all care withdrawn. Dr. Tito Jones notified.

## 2017-03-02 NOTE — ED NOTES
Pt extubated. Additional dose or morphine provided. Pt with agonal respirations. Family at bedside. Pt placed on 2 liters nasal canula by RT.

## 2017-03-02 NOTE — ED NOTES
Pt in asystole on monitor. No pulse detected. Pt with no respirations present. Dr. Jeanette Dancer paged.

## 2017-03-02 NOTE — ED NOTES
(LE) family members at the bedside. Update given. Remains ventilated.   Breath sounds remain essentially clear bilaterally

## 2017-03-02 NOTE — PROGRESS NOTES
Ms. Artemisa Epley is now unresponsive and intubated. She has arrested a few times. I spoke with her daughter. The  will arrive soon. She is unlikely to survive.   Current management with hospitalist team.

## 2017-03-02 NOTE — PROGRESS NOTES
Received request from Dr Westfall Letters at approx [phone] to place central venous cath, due to inadequacy of small peripheral IV. L clavicular area prepped with chlorhexidine and draped. Arrow 3-lumen cath then placed easily in L subclavian vein on first attempt, utilizing Seldinger technique. 7 steps of sterility adhered to throughout, including cap, mask, gown, and sterile gloves. Procedure completed at 1315. Absence of pneumothorax and correct position of CVC confirmed by chest Xray. No apparent complications.

## 2017-03-02 NOTE — ED NOTES
(BABITA) report received from [facility]. No other changes at the present time. Decision made to keep the pt in the ED due to acuity status.

## 2017-03-03 LAB
ABO + RH BLD: NORMAL
BLD PROD TYP BPU: NORMAL
BLD PROD TYP BPU: NORMAL
BLOOD GROUP ANTIBODIES SERPL: NORMAL
BPU ID: NORMAL
BPU ID: NORMAL
CROSSMATCH RESULT,%XM: NORMAL
CROSSMATCH RESULT,%XM: NORMAL
SPECIMEN EXP DATE BLD: NORMAL
STATUS OF UNIT,%ST: NORMAL
STATUS OF UNIT,%ST: NORMAL
UNIT DIVISION, %UDIV: 0
UNIT DIVISION, %UDIV: 0

## 2017-03-04 PROBLEM — R78.81 GRAM-POSITIVE COCCI BACTEREMIA: Status: ACTIVE | Noted: 2017-03-04

## 2017-03-04 PROBLEM — J69.0 ASPIRATION PNEUMONIA (HCC): Status: ACTIVE | Noted: 2017-03-04

## 2017-03-04 LAB
BACTERIA SPEC CULT: ABNORMAL
GRAM STN SPEC: ABNORMAL
GRAM STN SPEC: ABNORMAL
SERVICE CMNT-IMP: ABNORMAL

## 2017-03-05 LAB
BACTERIA SPEC CULT: ABNORMAL
BACTERIA SPEC CULT: ABNORMAL
GRAM STN SPEC: ABNORMAL
SERVICE CMNT-IMP: ABNORMAL

## 2017-03-07 LAB
BACTERIA SPEC CULT: NORMAL
SERVICE CMNT-IMP: NORMAL

## 2017-03-08 LAB
BACTERIA SPEC CULT: NORMAL
SERVICE CMNT-IMP: NORMAL

## 2021-12-09 NOTE — HPI: SKIN LESIONS
How Severe Is Your Skin Lesion?: mild
She was not anemic, so testing iron isn't helpful  She needs to get stomach checked.   shortness of breath - I can order Pulmonary Function Test. Will need a covid test prior  She already had a good heart workup last year, so unlikely for something new there.   
Have Your Skin Lesions Been Treated?: not been treated
Is This A New Presentation, Or A Follow-Up?: Skin Lesions

## 2023-09-05 NOTE — ANESTHESIA POSTPROCEDURE EVALUATION
Goal Outcome Evaluation:  Plan of Care Reviewed With: patient           Outcome Evaluation: Pt agreeable to PT this afternoon. She she is tired, but doing well. Pt showing progress w activity today. She was able to perform bed mobility w CGA/min . She also stood w CGA/min A w slightly more assist required coming to stand from low height commode. She ambulated approx 40 ft w HHA. She does exhibit slow pace, but is steady. Pt hopeful to return [place] at [place] and reports her daughter is able to stay with her. Pt back in bed at end of session w all needs in reach. Will continue to progress activity as tolerated.      Anticipated Discharge Disposition (PT): home with assist, home with home health   Post-Anesthesia Evaluation and Assessment    Patient: Jeremias Sanchez MRN: [id number]  SSN: [id number]    YOB: 1945  Age: 70 y.o. Sex: female       Cardiovascular Function/Vital Signs  Visit Vitals    /59    Pulse (!) 109    Temp 36.8 °C (98.3 °F)    Resp 16    Ht 5' 6\" (1.676 m)    Wt 115.7 kg (255 lb 1.2 oz)    SpO2 96%    Breastfeeding No    BMI 41.17 kg/m2     Bedside HR in 90s. Patient is status post general anesthesia for Procedure(s):  Right KNEE ARTHROPLASTY TOTAL REVISION WITH ISERTION OF ANTIBIOTIC KNEE SPACER AND BEADS. Nausea/Vomiting: None    Postoperative hydration reviewed and adequate. Pain:  Pain Scale 1: Visual (02/28/17 1641)  Pain Intensity 1: 0 (02/28/17 1641)   Managed    Neurological Status:   Neuro (WDL): Exceptions to WDL (02/28/17 1641)  Neuro  Neurologic State: Sleeping (02/28/17 1641)  Orientation Level: Oriented to person;Oriented to place (02/28/17 1556)  Cognition: Follows commands (02/28/17 1556)  Speech: Clear (02/28/17 1556)  LUE Motor Response: Purposeful (02/28/17 1641)  LLE Motor Response: Purposeful (02/28/17 1641)  RUE Motor Response: Purposeful (02/28/17 1641)  RLE Motor Response: Purposeful (02/28/17 1641)   At baseline    Mental Status and Level of Consciousness: Arousable    Pulmonary Status:   O2 Device: Nasal cannula (02/28/17 1641)   Adequate oxygenation and airway patent    Complications related to anesthesia: None    Post-anesthesia assessment completed. No concerns  No issues in PACU with O2 desaturation. To floor on continuous pulse ox.   Signed By: Melinda Del Rosario MD     February 28, 2017

## (undated) DEVICE — NEEDLE HYPO 21GA L1.5IN INTRAMUSCULAR S STL LATCH BVL UP

## (undated) DEVICE — SUTURE MCRYL SZ 2-0 L27IN ABSRB UD CP-1 1 L36MM 1/2 CIR REV Y266H

## (undated) DEVICE — HEX-LOCKING BLADE ELECTRODE: Brand: EDGE

## (undated) DEVICE — SOLUTION IRRIG 3000ML 0.9% SOD CHL FLX CONT 0797208] ICU MEDICAL INC]

## (undated) DEVICE — DISPOSABLE TOURNIQUET CUFF SINGLE BLADDER, DUAL PORT AND QUICK CONNECT CONNECTOR: Brand: COLOR CUFF

## (undated) DEVICE — DISPOSABLE DRAPE, STERILE, FOR A CDS-3060 5 FOOT TABLE: Brand: PEDIGO PRODUCTS, INC.

## (undated) DEVICE — 3M™ COBAN™ NL STERILE NON-LATEX SELF-ADHERENT WRAP, 2084S, 4 IN X 5 YD (10 CM X 4,5 M), 18 ROLLS/CASE: Brand: 3M™ COBAN™

## (undated) DEVICE — SYR LR LCK 1ML GRAD NSAF 30ML --

## (undated) DEVICE — REM POLYHESIVE ADULT PATIENT RETURN ELECTRODE: Brand: VALLEYLAB

## (undated) DEVICE — DRAPE,TOP,102X53,STERILE: Brand: MEDLINE

## (undated) DEVICE — 2000CC GUARDIAN II: Brand: GUARDIAN

## (undated) DEVICE — BUTTON SWITCH PENCIL BLADE ELECTRODE, HOLSTER: Brand: EDGE

## (undated) DEVICE — T4 HOOD

## (undated) DEVICE — Device

## (undated) DEVICE — TRAY PREP DRY W/ PREM GLV 2 APPL 6 SPNG 2 UNDPD 1 OVERWRAP

## (undated) DEVICE — MEDI-VAC YANKAUER SUCTION HANDLE W/BULBOUS TIP: Brand: CARDINAL HEALTH

## (undated) DEVICE — SYSTEM CULT COLL OR TRNSPRT CLR DBL SWAB W/ MOD AERB AMIES

## (undated) DEVICE — TRAY CATH 16F DRN BG LTX -- CONVERT TO ITEM 363158

## (undated) DEVICE — ASTOUND FABRIC REINFORCED SURGICAL GOWN: Brand: CONVERTORS

## (undated) DEVICE — SOLUTION IV 1000ML 0.9% SOD CHL

## (undated) DEVICE — AMD ANTIMICROBIAL NON-ADHERENT PAD,0.2% POLYHEXAMETHYLENE BIGUANIDE HCI (PHMB): Brand: TELFA

## (undated) DEVICE — SUTURE VCRL SZ 1 L27IN ABSRB UD L36MM CP-1 1/2 CIR REV CUT J268H

## (undated) DEVICE — SUTURE PDS II SZ 1 L54IN ABSRB VLT L65MM TP-1 1/2 CIR Z879G

## (undated) DEVICE — (D)PREP SKN CHLRAPRP APPL 26ML -- CONVERT TO ITEM 371833

## (undated) DEVICE — SYR 50ML LR LCK 1ML GRAD NSAF --

## (undated) DEVICE — 3000CC GUARDIAN II: Brand: GUARDIAN

## (undated) DEVICE — Device: Brand: STABLECUT®

## (undated) DEVICE — PACK PROCEDURE SURG TOT KNEE

## (undated) DEVICE — FAN SPRAY KIT: Brand: PULSAVAC®